# Patient Record
Sex: FEMALE | Race: WHITE | NOT HISPANIC OR LATINO | Employment: OTHER | URBAN - METROPOLITAN AREA
[De-identification: names, ages, dates, MRNs, and addresses within clinical notes are randomized per-mention and may not be internally consistent; named-entity substitution may affect disease eponyms.]

---

## 2020-02-04 ENCOUNTER — OFFICE VISIT (OUTPATIENT)
Dept: OBGYN | Facility: CLINIC | Age: 70
End: 2020-02-04
Payer: MEDICARE

## 2020-02-04 ENCOUNTER — TRANSFERRED RECORDS (OUTPATIENT)
Dept: HEALTH INFORMATION MANAGEMENT | Facility: CLINIC | Age: 70
End: 2020-02-04

## 2020-02-04 VITALS
HEIGHT: 65 IN | HEART RATE: 68 BPM | SYSTOLIC BLOOD PRESSURE: 144 MMHG | WEIGHT: 175.2 LBS | DIASTOLIC BLOOD PRESSURE: 86 MMHG | BODY MASS INDEX: 29.19 KG/M2

## 2020-02-04 DIAGNOSIS — Z01.818 PREOP EXAMINATION: ICD-10-CM

## 2020-02-04 DIAGNOSIS — H26.9 CATARACT OF BOTH EYES, UNSPECIFIED CATARACT TYPE: Primary | ICD-10-CM

## 2020-02-04 PROCEDURE — 99202 OFFICE O/P NEW SF 15 MIN: CPT | Performed by: NURSE PRACTITIONER

## 2020-02-04 RX ORDER — LISINOPRIL 5 MG/1
15 TABLET ORAL
COMMUNITY
Start: 2019-04-23 | End: 2020-08-19 | Stop reason: DRUGHIGH

## 2020-02-04 RX ORDER — METRONIDAZOLE 10 MG/G
GEL TOPICAL EVERY EVENING
COMMUNITY
Start: 2020-01-15

## 2020-02-04 ASSESSMENT — MIFFLIN-ST. JEOR: SCORE: 1317.64

## 2020-02-04 NOTE — PROGRESS NOTES
SUBJECTIVE:                                                   Mandy Barnes is a 69 year old female who presents to clinic today for the following health issue(s):  Patient presents with:  Pre-Op Exam: Has eye laser surgery scheduled on       HPI:  Pt here today for her preop for laser scar tissue removal on both eyes on 2020.    She is feeling well. Follows with her cardiologist irregularly.     No LMP recorded. Patient is postmenopausal..     Patient isn't sexually active, .  Using not sexually active for contraception.    reports that she has never smoked. She has never used smokeless tobacco.    STD testing offered?  Declined    Health maintenance updated:  no, due for mammo. Will get eye surgery first, then schedule later    Today's PHQ-2 Score:   PHQ-2 (  [employer]) 2020   Q1: Little interest or pleasure in doing things 0   Q2: Feeling down, depressed or hopeless 0   PHQ-2 Score 0     Problem list and histories reviewed & adjusted, as indicated.  Additional history: as documented.    There is no problem list on file for this patient.    Past Surgical History:   Procedure Laterality Date     APPENDECTOMY       AS HYSTEROSCOPY W ENDOMETRIAL BX/POLYPECTOMY W/WO D&C       C TOTAL HIP ARTHROPLASTY       EYE SURGERY  2015    cataract removal      Social History     Tobacco Use     Smoking status: Never Smoker     Smokeless tobacco: Never Used   Substance Use Topics     Alcohol use: Yes     Alcohol/week: 0.0 standard drinks     Comment: occ      Problem (# of Occurrences) Relation (Name,Age of Onset)    Cancer (1) Brother    Coronary Artery Disease (1) Father    Hyperlipidemia (1) Father    Hypertension (3) Mother, Sister, Sister    No Known Problems (5) Maternal Grandmother, Maternal Grandfather, Paternal Grandmother, Paternal Grandfather, Other            Current Outpatient Medications   Medication Sig     ASPIRIN PO      atorvastatin (LIPITOR) 80 MG tablet      carvedilol (COREG)  "6.25 MG tablet      lisinopril (PRINIVIL/ZESTRIL) 5 MG tablet Take 15 mg by mouth     metroNIDAZOLE (METROGEL) 1 % external gel      Multiple Vitamins-Minerals (MULTIVITAMIN PO)      No current facility-administered medications for this visit.      Allergies   Allergen Reactions     Penicillins Anaphylaxis     Pollen Extract      Latex Rash       ROS:  12 point review of systems negative other than symptoms noted below or in the HPI.  No urinary frequency or dysuria, bladder or kidney problems      OBJECTIVE:     BP (!) 144/86   Pulse 68   Ht 1.638 m (5' 4.5\")   Wt 79.5 kg (175 lb 3.2 oz)   BMI 29.61 kg/m    Body mass index is 29.61 kg/m .    Exam:  Constitutional:  Appearance: Well nourished, well developed alert, in no acute distress  Neck:  Lymph Nodes:  No lymphadenopathy present; Thyroid:  Gland size normal, nontender, no nodules or masses present on palpation  Chest:  Respiratory Effort:  Breathing unlabored. Clear to auscultation bilaterally.   Cardiovascular: Heart: Auscultation:  Regular rate, normal rhythm, no murmurs present  Gastrointestinal:  Abdominal Examination:  Abdomen nontender to palpation, tone normal without rigidity or guarding, no masses present, umbilicus without lesions; Liver/Spleen:  No hepatomegaly present, liver nontender to palpation; Hernias:  No hernias present  Lymphatic: Lymph Nodes:  No other lymphadenopathy present  Skin: General Inspection:  No rashes present, no lesions present, no areas of discoloration.  Neurologic:  Mental Status:  Oriented X3.  Normal strength and tone, sensory exam grossly normal, mentation intact and speech normal.    Psychiatric:  Mentation appears normal and affect normal/bright.     In-Clinic Test Results:  No results found for this or any previous visit (from the past 24 hour(s)).    ASSESSMENT/PLAN:                                                        ICD-10-CM    1. Cataract of both eyes, unspecified cataract type H26.9    2. Preop examination " Z01.818        There are no Patient Instructions on file for this visit.    Pt cleared for surgery for laser scar tissue removal of both eyes. Needs EKG. Will contact her cardiologist for this.     Pre op form faxed and copy given to pt.    WANDY Etienne CNP  [facility]

## 2020-08-17 NOTE — PROGRESS NOTES
SUBJECTIVE:                                                   Mandy Barnes is a 70 year old female who presents to clinic today for the following health issue(s):  Patient presents with:  Vaginal Problem: possible prolapse or vaginal swelling noticed a week ago      HPI:  71 yo female who hasn't had a pelvic exam since 2016 is here today for pap screening and has concerns about a vaginal swelling/bulge she's felt on her bottom.     She has no pain with the bulge, no vaginal bleeding. She is PM since her 50's she is not on any HRT at this time. She had 2 vaginal deliveries, largest baby was 7#11oz.    She denies any changes to bowel or bladder function. She is not currenlty s.a.     Her last mammogram was in 2016 as well. She has what she calls an irrational fear of an abnormal mammogram which is why she hasn't had one in a while. No fam hx.       No LMP recorded. Patient is postmenopausal..     Patient is not sexually active, .  Using menopause for contraception.    reports that she has never smoked. She has never used smokeless tobacco.    STD testing offered?  Declined - not sexually active    Health maintenance updated:  no    Today's PHQ-2 Score:   PHQ-2 (  [employer]) 2020   Q1: Little interest or pleasure in doing things 0   Q2: Feeling down, depressed or hopeless 0   PHQ-2 Score 0     Today's PHQ-9 Score: No flowsheet data found.  Today's LOKI-7 Score: No flowsheet data found.    Problem list and histories reviewed & adjusted, as indicated.  Additional history: as documented.    There is no problem list on file for this patient.    Past Surgical History:   Procedure Laterality Date     APPENDECTOMY  1969     AS HYSTEROSCOPY W ENDOMETRIAL BX/POLYPECTOMY W/WO D&C       C TOTAL HIP ARTHROPLASTY  2013     EYE SURGERY  2015    cataract removal      Social History     Tobacco Use     Smoking status: Never Smoker     Smokeless tobacco: Never Used   Substance Use Topics     Alcohol use: Yes      "Alcohol/week: 0.0 standard drinks     Comment: occ      Problem (# of Occurrences) Relation (Name,Age of Onset)    Cancer (1) Brother    Coronary Artery Disease (1) Father    Hyperlipidemia (1) Father    Hypertension (3) Mother, Sister, Sister    No Known Problems (5) Maternal Grandmother, Maternal Grandfather, Paternal Grandmother, Paternal Grandfather, Other            Current Outpatient Medications   Medication Sig     ASPIRIN PO      atorvastatin (LIPITOR) 80 MG tablet      carvedilol (COREG) 6.25 MG tablet      metroNIDAZOLE (METROGEL) 1 % external gel      Multiple Vitamins-Minerals (MULTIVITAMIN PO)      lisinopril (ZESTRIL) 20 MG tablet      No current facility-administered medications for this visit.      Allergies   Allergen Reactions     Penicillins Anaphylaxis     Pollen Extract      Latex Rash       ROS:  12 point review of systems negative other than symptoms noted below or in the HPI.  No urinary frequency or dysuria, bladder or kidney problems      OBJECTIVE:     BP (!) 126/96   Ht 1.664 m (5' 5.5\")   Wt 79 kg (174 lb 3.2 oz)   BMI 28.55 kg/m    Body mass index is 28.55 kg/m .    Exam:  Constitutional:  Appearance: Well nourished, well developed alert, in no acute distress  Psychiatric:  Mentation appears normal and affect normal/bright.  Pelvic Exam:  External Genitalia:     Normal appearance for age, no discharge present, no tenderness present, no inflammatory lesions present, color normal- BULGE AT INTROITUS  Vagina:     Normal vaginal vault without central or paravaginal defects, no discharge present, no inflammatory lesions present, no masses present- BULGING AT POSTERIOR VAGINAL WALL.   Bladder:     Nontender to palpation  Urethra:   Urethral Body:  Urethra palpation normal, urethra structural support normal   Urethral Meatus:  No erythema or lesions present  Cervix:     Appearance healthy, no lesions present, nontender to palpation, no bleeding present  Uterus:     Uterus: firm, normal " sized and nontender, anteverted in position.   Adnexa:     No adnexal tenderness present, no adnexal masses present  Perineum:     Perineum within normal limits, no evidence of trauma, no rashes or skin lesions present  Anus:     Anus within normal limits, no hemorrhoids present  Inguinal Lymph Nodes:     No lymphadenopathy present  Pubic Hair:     Normal pubic hair distribution for age  Genitalia and Groin:     No rashes present, no lesions present, no areas of discoloration, no masses present       In-Clinic Test Results:  No results found for this or any previous visit (from the past 24 hour(s)).    ASSESSMENT/PLAN:                                                        ICD-10-CM    1. Screening for cervical cancer  Z12.4 Pap imaged thin layer screen with HPV - recommended age 30 - 65 years (select HPV order below)     HPV High Risk Types DNA Cervical   2. Rectocele  N81.6    3. Encounter for screening mammogram for malignant neoplasm of breast  Z12.31 *MA Screening Digital Bilateral       There are no Patient Instructions on file for this visit.    Pap collected. If NIL no further screening is needed. Needs mammogram done. Referred to DR. Melo for discussion about options for rectocele. Pessary, surgery, conservative management with kegals etc. Pt is asymptomatic at this time.     WANDY Etienne [facility] [place]

## 2020-08-19 ENCOUNTER — OFFICE VISIT (OUTPATIENT)
Dept: OBGYN | Facility: CLINIC | Age: 70
End: 2020-08-19
Payer: MEDICARE

## 2020-08-19 VITALS
DIASTOLIC BLOOD PRESSURE: 96 MMHG | WEIGHT: 174.2 LBS | BODY MASS INDEX: 28 KG/M2 | HEIGHT: 66 IN | SYSTOLIC BLOOD PRESSURE: 126 MMHG

## 2020-08-19 DIAGNOSIS — Z12.31 ENCOUNTER FOR SCREENING MAMMOGRAM FOR MALIGNANT NEOPLASM OF BREAST: ICD-10-CM

## 2020-08-19 DIAGNOSIS — N81.6 RECTOCELE: ICD-10-CM

## 2020-08-19 DIAGNOSIS — Z12.4 SCREENING FOR CERVICAL CANCER: Primary | ICD-10-CM

## 2020-08-19 PROCEDURE — G0476 HPV COMBO ASSAY CA SCREEN: HCPCS | Mod: GZ | Performed by: NURSE PRACTITIONER

## 2020-08-19 PROCEDURE — G0145 SCR C/V CYTO,THINLAYER,RESCR: HCPCS | Performed by: NURSE PRACTITIONER

## 2020-08-19 PROCEDURE — 99213 OFFICE O/P EST LOW 20 MIN: CPT | Performed by: NURSE PRACTITIONER

## 2020-08-19 PROCEDURE — 87624 HPV HI-RISK TYP POOLED RSLT: CPT | Performed by: NURSE PRACTITIONER

## 2020-08-19 RX ORDER — LISINOPRIL 20 MG/1
20 TABLET ORAL EVERY MORNING
COMMUNITY
Start: 2020-07-22

## 2020-08-19 ASSESSMENT — MIFFLIN-ST. JEOR: SCORE: 1323.98

## 2020-08-19 NOTE — LETTER
August 28, 2020    Mandy ESPINOSA Cameron  [address] [place] [id number]    Dear lps,  This letter is regarding your recent Pap smear (cervical cancer screening) and Human Papillomavirus (HPV) test.  We are happy to inform you that your Pap smear result is normal. Cervical cancer is closely linked with certain types of HPV. Your results showed no evidence of high-risk HPV.  No further Pap screening is needed.  You will still need to return to the clinic every year for an annual exam and other preventive tests.  If you have additional questions regarding this result, please call our registered nurse, Yaneth at [phone].  Sincerely,    Sherry Naranjo, APRN CNP/rlm

## 2020-08-24 LAB
COPATH REPORT: NORMAL
PAP: NORMAL

## 2020-08-26 LAB
FINAL DIAGNOSIS: NORMAL
HPV HR 12 DNA CVX QL NAA+PROBE: NEGATIVE
HPV16 DNA SPEC QL NAA+PROBE: NEGATIVE
HPV18 DNA SPEC QL NAA+PROBE: NEGATIVE
SPECIMEN DESCRIPTION: NORMAL
SPECIMEN SOURCE CVX/VAG CYTO: NORMAL

## 2020-09-01 NOTE — PROGRESS NOTES
SUBJECTIVE:                                                   Mandy Barnes is a 70 year old female who presents to clinic today for the following health issue(s):  Patient presents with:  Consult: Consult for rectocele. Saw JUSTINO and was referred to Dr. Melo for a consult on intervention at this time.        HPI: The patient is seen at this time for a rather acute history of a significant vaginal bulge.  She has had 2 vaginal deliveries of less than 8 pound babies.  She has had a life of doing lots of lifting and is never thought of any restrictions to any activity.  She is not sexually active at this time.  She denies any urinary stress incontinence.  She does not admit to any splinting for defecation.      No LMP recorded. Patient is postmenopausal..     Patient is not sexually active, .  Using menopause for contraception.    reports that she has never smoked. She has never used smokeless tobacco.    STD testing offered?  Declined    Health maintenance updated:  yes    Today's PHQ-2 Score:   PHQ-2 (  [employer]) 2020   Q1: Little interest or pleasure in doing things 0   Q2: Feeling down, depressed or hopeless 0   PHQ-2 Score 0     Today's PHQ-9 Score: No flowsheet data found.  Today's LOKI-7 Score: No flowsheet data found.    Problem list and histories reviewed & adjusted, as indicated.  Additional history: as documented.    There is no problem list on file for this patient.    Past Surgical History:   Procedure Laterality Date     APPENDECTOMY  1969     AS HYSTEROSCOPY W ENDOMETRIAL BX/POLYPECTOMY W/WO D&C       C TOTAL HIP ARTHROPLASTY       EYE SURGERY  2015    cataract removal      Social History     Tobacco Use     Smoking status: Never Smoker     Smokeless tobacco: Never Used   Substance Use Topics     Alcohol use: Yes     Alcohol/week: 0.0 standard drinks     Comment: occ      Problem (# of Occurrences) Relation (Name,Age of Onset)    Cancer (1) Brother    Coronary Artery Disease (1)  "Father    Hyperlipidemia (1) Father    Hypertension (3) Mother, Sister, Sister    No Known Problems (5) Maternal Grandmother, Maternal Grandfather, Paternal Grandmother, Paternal Grandfather, Other            Current Outpatient Medications   Medication Sig     ASPIRIN PO      atorvastatin (LIPITOR) 80 MG tablet      carvedilol (COREG) 6.25 MG tablet      lisinopril (ZESTRIL) 20 MG tablet      metroNIDAZOLE (METROGEL) 1 % external gel      Multiple Vitamins-Minerals (MULTIVITAMIN PO)      No current facility-administered medications for this visit.      Allergies   Allergen Reactions     Penicillins Anaphylaxis     Pollen Extract      Latex Rash       ROS:  12 point review of systems negative other than symptoms noted below or in the HPI.  No urinary frequency or dysuria, bladder or kidney problems      OBJECTIVE:     BP (!) 142/96   Ht 1.664 m (5' 5.5\")   Wt 78.6 kg (173 lb 3.2 oz)   Breastfeeding No   BMI 28.38 kg/m    Body mass index is 28.38 kg/m .    Exam:  Gastrointestinal:  Abdominal Examination:  Abdomen nontender to palpation, tone normal without rigidity or guarding, no masses present, umbilicus without lesions; Liver/Spleen:  No hepatomegaly present, liver nontender to palpation; Hernias:  No hernias present  Lymphatic: Lymph Nodes:  No other lymphadenopathy present  Skin: General Inspection:  No rashes present, no lesions present, no areas of discoloration.  Neurologic:  Mental Status:  Oriented X3.  Normal strength and tone, sensory exam grossly normal, mentation intact and speech normal.    Psychiatric:  Mentation appears normal and affect normal/bright.  Pelvic Exam:  External Genitalia:     Normal appearance for age, no discharge present, no tenderness present, no inflammatory lesions present, color normal  Vagina: Telescoping rectocele present.    Bladder: No cystocele   Nontender to palpation  Urethra:   Urethral Body:  Urethra palpation normal, urethra structural support normal   Urethral " Meatus:  No erythema or lesions present  Cervix:     Appearance healthy, no lesions present, nontender to palpation, no bleeding present  Uterus: 1.5 degree descensus   Nontender to palpation, no masses present, position anteflexed, mobility: normal  Adnexa:     No adnexal tenderness present, no adnexal masses present  Perineum:     Perineum within normal limits, no evidence of trauma, no rashes or skin lesions present  Inguinal Lymph Nodes:     No lymphadenopathy present       In-Clinic Test Results:      ASSESSMENT/PLAN:                                                      70-year-old patient with a significant grade 3 rectocele and 1.5 degree uterine descensus.  She has excellent anterior support of the bladder neck and no cystocele present.  We have gone over the anatomy and pathophysiology of these herniations of the pelvic floor.  She has a very active lifestyle and she is stunned at the possibility of not being able to lift anything heavy again.  We have reviewed the entire surgical procedure of removing her uterus tubes and ovaries and doing a enterocele and rectocele repair.  She is not sexually active at this time but does not preclude the possibility down the road.  She will decide on disposition of this surgery when she forms a plan for day-to-day living without lifting.            Mil Melo MD  [facility] [place]

## 2020-09-02 ENCOUNTER — OFFICE VISIT (OUTPATIENT)
Dept: OBGYN | Facility: CLINIC | Age: 70
End: 2020-09-02
Payer: MEDICARE

## 2020-09-02 VITALS
SYSTOLIC BLOOD PRESSURE: 142 MMHG | BODY MASS INDEX: 27.83 KG/M2 | DIASTOLIC BLOOD PRESSURE: 96 MMHG | HEIGHT: 66 IN | WEIGHT: 173.2 LBS

## 2020-09-02 DIAGNOSIS — N81.2 UTEROVAGINAL PROLAPSE, INCOMPLETE: Primary | ICD-10-CM

## 2020-09-02 PROCEDURE — 99214 OFFICE O/P EST MOD 30 MIN: CPT | Performed by: OBSTETRICS & GYNECOLOGY

## 2020-09-02 ASSESSMENT — MIFFLIN-ST. JEOR: SCORE: 1319.44

## 2020-11-03 NOTE — PROGRESS NOTES
SUBJECTIVE:                                                   Mandy Barnes is a 70 year old female who presents to clinic today for the following health issue(s):  Patient presents with:  Consult: Consult for surgical procedure of removing her uterus tubes and ovaries and doing a enterocele and rectocele repair.      HPI: The patient is seen at this time for consultation about her rectocele and enterocele.  She does have some uterine descensus.  She has numerous questions about what her level of functioning will be from now until surgery and then postoperatively.  She does not plan on doing this until the spring.      No LMP recorded. Patient is postmenopausal..     Patient is not sexually active, .  Using menopause for contraception.    reports that she has never smoked. She has never used smokeless tobacco.    STD testing offered?  Declined    Health maintenance updated:  yes    Today's PHQ-2 Score:   PHQ-2 (  [employer]) 2020   Q1: Little interest or pleasure in doing things 0   Q2: Feeling down, depressed or hopeless 0   PHQ-2 Score 0     Today's PHQ-9 Score: No flowsheet data found.  Today's LOKI-7 Score: No flowsheet data found.    Problem list and histories reviewed & adjusted, as indicated.  Additional history: as documented.    There is no problem list on file for this patient.    Past Surgical History:   Procedure Laterality Date     APPENDECTOMY  1969     AS HYSTEROSCOPY W ENDOMETRIAL BX/POLYPECTOMY W/WO D&C       C TOTAL HIP ARTHROPLASTY       EYE SURGERY  2015    cataract removal      Social History     Tobacco Use     Smoking status: Never Smoker     Smokeless tobacco: Never Used   Substance Use Topics     Alcohol use: Yes     Alcohol/week: 0.0 standard drinks     Comment: occ      Problem (# of Occurrences) Relation (Name,Age of Onset)    Cancer (1) Brother    Coronary Artery Disease (1) Father    Hyperlipidemia (1) Father    Hypertension (3) Mother, Sister, Sister    No Known  "Problems (5) Maternal Grandmother, Maternal Grandfather, Paternal Grandmother, Paternal Grandfather, Other            Current Outpatient Medications   Medication Sig     ASPIRIN PO      atorvastatin (LIPITOR) 80 MG tablet      carvedilol (COREG) 6.25 MG tablet      lisinopril (ZESTRIL) 20 MG tablet      metroNIDAZOLE (METROGEL) 1 % external gel      Multiple Vitamins-Minerals (MULTIVITAMIN PO)      No current facility-administered medications for this visit.      Allergies   Allergen Reactions     Penicillins Anaphylaxis     Pollen Extract      Latex Rash       ROS:  12 point review of systems negative other than symptoms noted below or in the HPI.  No urinary frequency or dysuria, bladder or kidney problems      OBJECTIVE:     BP (!) 170/82   Ht 1.651 m (5' 5\")   BMI 28.82 kg/m    Body mass index is 28.82 kg/m .    Exam:  Constitutional:  Appearance: Well nourished, well developed alert, in no acute distress     In-Clinic Test Results:      ASSESSMENT/PLAN:                                                      Patient with uterine descensus and a large rectocele.  We spent 15 minutes going over all of her preoperative weight restrictions and activity.  She plans on doing this surgery in February or March and will return to see us in January.  She has no stress component and will not need any urodynamics.      Mil Melo MD  [facility] [place]  "

## 2020-11-04 ENCOUNTER — OFFICE VISIT (OUTPATIENT)
Dept: OBGYN | Facility: CLINIC | Age: 70
End: 2020-11-04
Payer: MEDICARE

## 2020-11-04 VITALS — DIASTOLIC BLOOD PRESSURE: 82 MMHG | BODY MASS INDEX: 28.82 KG/M2 | SYSTOLIC BLOOD PRESSURE: 170 MMHG | HEIGHT: 65 IN

## 2020-11-04 DIAGNOSIS — N81.2 UTEROVAGINAL PROLAPSE, INCOMPLETE: Primary | ICD-10-CM

## 2020-11-04 PROCEDURE — 99213 OFFICE O/P EST LOW 20 MIN: CPT | Performed by: OBSTETRICS & GYNECOLOGY

## 2021-01-04 ENCOUNTER — ANCILLARY PROCEDURE (OUTPATIENT)
Dept: MAMMOGRAPHY | Facility: CLINIC | Age: 71
End: 2021-01-04
Attending: NURSE PRACTITIONER
Payer: MEDICARE

## 2021-01-04 DIAGNOSIS — Z12.31 ENCOUNTER FOR SCREENING MAMMOGRAM FOR MALIGNANT NEOPLASM OF BREAST: ICD-10-CM

## 2021-01-04 PROCEDURE — 77067 SCR MAMMO BI INCL CAD: CPT | Mod: TC | Performed by: RADIOLOGY

## 2021-01-15 ENCOUNTER — HEALTH MAINTENANCE LETTER (OUTPATIENT)
Age: 71
End: 2021-01-15

## 2021-01-26 NOTE — PROGRESS NOTES
SUBJECTIVE:                                                   Mandy Barnes is a 70 year old female who presents to clinic today for the following health issue(s):  Patient presents with:  Consult: F/u consult for surgical procedure of removing her uterus, tubes, and ovaries and doing a enterocele and rectocele repair.        HPI: The patient is a 70-year-old  2 who has been worked up for uterovaginal prolapse with symptomatic enterocele and rectocele.  We have recommended a laparoscopic approach for removal of the uterus tubes and ovaries and repair of all components of relaxation.      No LMP recorded. Patient is postmenopausal..     Patient is not sexually active, .  Using menopause for contraception.    reports that she has never smoked. She has never used smokeless tobacco.      Health maintenance updated:  yes    Today's PHQ-2 Score:   PHQ-2 (  [employer]) 2020   Q1: Little interest or pleasure in doing things 0   Q2: Feeling down, depressed or hopeless 0   PHQ-2 Score 0     Today's PHQ-9 Score: No flowsheet data found.  Today's LOKI-7 Score: No flowsheet data found.    Problem list and histories reviewed & adjusted, as indicated.  Additional history: as documented.    There is no problem list on file for this patient.    Past Surgical History:   Procedure Laterality Date     APPENDECTOMY  1969     AS HYSTEROSCOPY W ENDOMETRIAL BX/POLYPECTOMY W/WO D&C       C TOTAL HIP ARTHROPLASTY       EYE SURGERY  2015    cataract removal      Social History     Tobacco Use     Smoking status: Never Smoker     Smokeless tobacco: Never Used   Substance Use Topics     Alcohol use: Yes     Alcohol/week: 0.0 standard drinks     Comment: occ      Problem (# of Occurrences) Relation (Name,Age of Onset)    Cancer (1) Brother    Coronary Artery Disease (1) Father    Hyperlipidemia (1) Father    Hypertension (3) Mother, Sister, Sister    No Known Problems (5) Maternal Grandmother, Maternal Grandfather,  "Paternal Grandmother, Paternal Grandfather, Other            Current Outpatient Medications   Medication Sig     ASPIRIN PO      atorvastatin (LIPITOR) 80 MG tablet      carvedilol (COREG) 6.25 MG tablet      lisinopril (ZESTRIL) 20 MG tablet      metroNIDAZOLE (METROGEL) 1 % external gel      Multiple Vitamins-Minerals (MULTIVITAMIN PO)      No current facility-administered medications for this visit.      Allergies   Allergen Reactions     Penicillins Anaphylaxis     Pollen Extract      Latex Rash       ROS:  12 point review of systems negative other than symptoms noted below or in the HPI.  No urinary frequency or dysuria, bladder or kidney problems      OBJECTIVE:     BP (!) 178/94   Ht 1.651 m (5' 5\")   Wt 77.2 kg (170 lb 3.2 oz)   Breastfeeding No   BMI 28.32 kg/m    Body mass index is 28.32 kg/m .    Exam:  Constitutional:  Appearance: Well nourished, well developed alert, in no acute distress  Neck:  Lymph Nodes:  No lymphadenopathy present; Thyroid:  Gland size normal, nontender, no nodules or masses present on palpation  Chest:  Respiratory Effort:  Breathing unlabored. Clear to auscultation bilaterally.   Cardiovascular: Heart: Auscultation:  Regular rate, normal rhythm, no murmurs present  Gastrointestinal:  Abdominal Examination:  Abdomen nontender to palpation, tone normal without rigidity or guarding, no masses present, umbilicus without lesions; Liver/Spleen:  No hepatomegaly present, liver nontender to palpation; Hernias:  No hernias present  Lymphatic: Lymph Nodes:  No other lymphadenopathy present  Skin: General Inspection:  No rashes present, no lesions present, no areas of discoloration.  Neurologic:  Mental Status:  Oriented X3.  Normal strength and tone, sensory exam grossly normal, mentation intact and speech normal.    Psychiatric:  Mentation appears normal and affect normal/bright.  No Pelvic Exam performed     In-Clinic Test Results:      ASSESSMENT/PLAN:                                "                         70-year-old patient with uterovaginal prolapse.  We have recommended a laparoscopic-assisted vaginal hysterectomy with bilateral salpingo-oophorectomy anterior and posterior repair and enterocele repair.  The risk and complications were reviewed and accepted.  The patient will be seeing her cardiologist tomorrow as she has a history of an arrhythmia.  We will see her back for her preoperative examination the week before her actual surgery day.          Mil Melo MD  [facility] [place]

## 2021-01-27 ENCOUNTER — PREP FOR PROCEDURE (OUTPATIENT)
Dept: OBGYN | Facility: CLINIC | Age: 71
End: 2021-01-27

## 2021-01-27 ENCOUNTER — TELEPHONE (OUTPATIENT)
Dept: OBGYN | Facility: CLINIC | Age: 71
End: 2021-01-27

## 2021-01-27 ENCOUNTER — OFFICE VISIT (OUTPATIENT)
Dept: OBGYN | Facility: CLINIC | Age: 71
End: 2021-01-27
Payer: MEDICARE

## 2021-01-27 VITALS
WEIGHT: 170.2 LBS | DIASTOLIC BLOOD PRESSURE: 94 MMHG | HEIGHT: 65 IN | SYSTOLIC BLOOD PRESSURE: 178 MMHG | BODY MASS INDEX: 28.36 KG/M2

## 2021-01-27 DIAGNOSIS — N81.4 UTEROVAGINAL PROLAPSE: Primary | ICD-10-CM

## 2021-01-27 PROCEDURE — 99214 OFFICE O/P EST MOD 30 MIN: CPT | Performed by: OBSTETRICS & GYNECOLOGY

## 2021-01-27 ASSESSMENT — MIFFLIN-ST. JEOR: SCORE: 1297.9

## 2021-01-27 NOTE — TELEPHONE ENCOUNTER
Type of surgery: LAVH BSO A&P RPR  Location of surgery: [facility] OR  Date and time of surgery: 4/6/2021 7:20a  Surgeon: SHANNON  Pre-Op Appt Date: 3/31/2021   Post-Op Appt Date: TBD   Packet sent out: HANDED 1/27/2021  Pre-cert/Authorization completed:  TBD  Date: 1/27/2021 Bernice silva/Lizbet COYNEID TEST 4/3/2021 TBD    Cristine Vera  Surgery Scheduler    DX N81.4  CPT 26656   [id number]

## 2021-03-01 NOTE — TELEPHONE ENCOUNTER
SX DATE CHANGED DUE TO EB OUT  Spoke shira/Cristine for changes    4/20/2021 7:20a   COVID 4/17/2021 10 [facility]    Pt aware via phone call    Cristine Reeves  Surgery Scheduler

## 2021-03-30 NOTE — PROGRESS NOTES
SUBJECTIVE:                                                   Mandy Barnes is a 70 year old female who presents to clinic today for the following health issue(s):  Patient presents with:  Pre-Op Exam: TOTAL LAPAROSCOPIC VAGINAL HYSTERECTOMY , BILATERAL SALPINGO-OOPHORECTOMY        HPI: The patient is being seen at this time for preoperative clearance for a laparoscopic-assisted vaginal hysterectomy with bilateral salpingo-oophorectomy.  The patient's overall health is good.  She had a hemoglobin and her cardiologist on 3/24 that was 14.7 g%.  She is on antihypertensive medicines and anticholesterol drugs that we reviewed for her preop day.      No LMP recorded. Patient is postmenopausal..     Patient is not sexually active, .  Using menopause for contraception.    reports that she has never smoked. She has never used smokeless tobacco.    STD testing offered?  Declined    Health maintenance updated:  yes    Today's PHQ-2 Score:   PHQ-2 (  [employer]) 2020   Q1: Little interest or pleasure in doing things 0   Q2: Feeling down, depressed or hopeless 0   PHQ-2 Score 0     Today's PHQ-9 Score: No flowsheet data found.  Today's LOKI-7 Score: No flowsheet data found.    Problem list and histories reviewed & adjusted, as indicated.  Additional history: as documented.    Patient Active Problem List   Diagnosis     Uterovaginal prolapse     Past Surgical History:   Procedure Laterality Date     APPENDECTOMY  1969     AS HYSTEROSCOPY W ENDOMETRIAL BX/POLYPECTOMY W/WO D&C       C TOTAL HIP ARTHROPLASTY       EYE SURGERY  2015    cataract removal      Social History     Tobacco Use     Smoking status: Never Smoker     Smokeless tobacco: Never Used   Substance Use Topics     Alcohol use: Yes     Alcohol/week: 0.0 standard drinks     Comment: occ      Problem (# of Occurrences) Relation (Name,Age of Onset)    Cancer (1) Brother    Coronary Artery Disease (1) Father    Hyperlipidemia (1) Father    Hypertension (3)  "Mother, Sister, Sister    No Known Problems (5) Maternal Grandmother, Maternal Grandfather, Paternal Grandmother, Paternal Grandfather, Other            Current Outpatient Medications   Medication Sig     ASPIRIN PO      atorvastatin (LIPITOR) 80 MG tablet      carvedilol (COREG) 6.25 MG tablet 6.25 mg 2 times daily (with meals) Also takes 12.5 in the evening     lisinopril (ZESTRIL) 20 MG tablet      LUMIGAN 0.01 % SOLN ophthalmic solution      metroNIDAZOLE (METROGEL) 1 % external gel      Multiple Vitamins-Minerals (MULTIVITAMIN PO)      No current facility-administered medications for this visit.      Allergies   Allergen Reactions     Penicillins Anaphylaxis     Pollen Extract      Latex Rash       ROS:  12 point review of systems negative other than symptoms noted below or in the HPI.  No urinary frequency or dysuria, bladder or kidney problems      OBJECTIVE:     BP (!) 152/78   Ht 1.651 m (5' 5\")   Wt 76 kg (167 lb 9.6 oz)   Breastfeeding No   BMI 27.89 kg/m    Body mass index is 27.89 kg/m .    Exam:  Constitutional:  Appearance: Well nourished, well developed alert, in no acute distress  Neck:  Lymph Nodes:  No lymphadenopathy present; Thyroid:  Gland size normal, nontender, no nodules or masses present on palpation  Chest:  Respiratory Effort:  Breathing unlabored. Clear to auscultation bilaterally.   Cardiovascular: Heart: Auscultation:  Regular rate, normal rhythm, no murmurs present  Gastrointestinal:  Abdominal Examination:  Abdomen nontender to palpation, tone normal without rigidity or guarding, no masses present, umbilicus without lesions; Liver/Spleen:  No hepatomegaly present, liver nontender to palpation; Hernias:  No hernias present  Lymphatic: Lymph Nodes:  No other lymphadenopathy present  Skin: General Inspection:  No rashes present, no lesions present, no areas of discoloration.  Neurologic:  Mental Status:  Oriented X3.  Normal strength and tone, sensory exam grossly normal, mentation " intact and speech normal.    Psychiatric:  Mentation appears normal and affect normal/bright.  Pelvic Exam:  External Genitalia:     Normal appearance for age, no discharge present, no tenderness present, no inflammatory lesions present, color normal  Vagina:     Normal vaginal vault without central or paravaginal defects, ATROPHIC  Bladder:     Nontender to palpation  Urethra:   Urethral Body:  Urethra palpation normal, urethra structural support normal   Urethral Meatus:  No erythema or lesions present  Cervix:     Appearance healthy, no lesions present, nontender to palpation, no bleeding present  Uterus:     Nontender to palpation, no masses present, position anteflexed, mobility: normal  Adnexa:     No adnexal tenderness present, no adnexal masses present  Perineum:     Perineum within normal limits, no evidence of trauma, no rashes or skin lesions present  Inguinal Lymph Nodes:     No lymphadenopathy present       In-Clinic Test Results:      ASSESSMENT/PLAN:                                                        ICD-10-CM    1. Prolapse of vaginal vault after hysterectomy  N99.3          70-year-old patient with uterovaginal prolapse but no stress incontinence.  She is cleared for anesthesia and has good hemoglobin.  She understands the risk and complications of lap assisted vaginal hysterectomy with bilateral salpingo-oophorectomy.      Mil Melo MD  [facility] [place]

## 2021-03-30 NOTE — H&P (VIEW-ONLY)
SUBJECTIVE:                                                   Mandy Barnes is a 70 year old female who presents to clinic today for the following health issue(s):  Patient presents with:  Pre-Op Exam: TOTAL LAPAROSCOPIC VAGINAL HYSTERECTOMY , BILATERAL SALPINGO-OOPHORECTOMY        HPI: The patient is being seen at this time for preoperative clearance for a laparoscopic-assisted vaginal hysterectomy with bilateral salpingo-oophorectomy.  The patient's overall health is good.  She had a hemoglobin and her cardiologist on 3/24 that was 14.7 g%.  She is on antihypertensive medicines and anticholesterol drugs that we reviewed for her preop day.      No LMP recorded. Patient is postmenopausal..     Patient is not sexually active, .  Using menopause for contraception.    reports that she has never smoked. She has never used smokeless tobacco.    STD testing offered?  Declined    Health maintenance updated:  yes    Today's PHQ-2 Score:   PHQ-2 (  [employer]) 2020   Q1: Little interest or pleasure in doing things 0   Q2: Feeling down, depressed or hopeless 0   PHQ-2 Score 0     Today's PHQ-9 Score: No flowsheet data found.  Today's LOKI-7 Score: No flowsheet data found.    Problem list and histories reviewed & adjusted, as indicated.  Additional history: as documented.    Patient Active Problem List   Diagnosis     Uterovaginal prolapse     Past Surgical History:   Procedure Laterality Date     APPENDECTOMY  1969     AS HYSTEROSCOPY W ENDOMETRIAL BX/POLYPECTOMY W/WO D&C       C TOTAL HIP ARTHROPLASTY       EYE SURGERY  2015    cataract removal      Social History     Tobacco Use     Smoking status: Never Smoker     Smokeless tobacco: Never Used   Substance Use Topics     Alcohol use: Yes     Alcohol/week: 0.0 standard drinks     Comment: occ      Problem (# of Occurrences) Relation (Name,Age of Onset)    Cancer (1) Brother    Coronary Artery Disease (1) Father    Hyperlipidemia (1) Father    Hypertension (3)  "Mother, Sister, Sister    No Known Problems (5) Maternal Grandmother, Maternal Grandfather, Paternal Grandmother, Paternal Grandfather, Other            Current Outpatient Medications   Medication Sig     ASPIRIN PO      atorvastatin (LIPITOR) 80 MG tablet      carvedilol (COREG) 6.25 MG tablet 6.25 mg 2 times daily (with meals) Also takes 12.5 in the evening     lisinopril (ZESTRIL) 20 MG tablet      LUMIGAN 0.01 % SOLN ophthalmic solution      metroNIDAZOLE (METROGEL) 1 % external gel      Multiple Vitamins-Minerals (MULTIVITAMIN PO)      No current facility-administered medications for this visit.      Allergies   Allergen Reactions     Penicillins Anaphylaxis     Pollen Extract      Latex Rash       ROS:  12 point review of systems negative other than symptoms noted below or in the HPI.  No urinary frequency or dysuria, bladder or kidney problems      OBJECTIVE:     BP (!) 152/78   Ht 1.651 m (5' 5\")   Wt 76 kg (167 lb 9.6 oz)   Breastfeeding No   BMI 27.89 kg/m    Body mass index is 27.89 kg/m .    Exam:  Constitutional:  Appearance: Well nourished, well developed alert, in no acute distress  Neck:  Lymph Nodes:  No lymphadenopathy present; Thyroid:  Gland size normal, nontender, no nodules or masses present on palpation  Chest:  Respiratory Effort:  Breathing unlabored. Clear to auscultation bilaterally.   Cardiovascular: Heart: Auscultation:  Regular rate, normal rhythm, no murmurs present  Gastrointestinal:  Abdominal Examination:  Abdomen nontender to palpation, tone normal without rigidity or guarding, no masses present, umbilicus without lesions; Liver/Spleen:  No hepatomegaly present, liver nontender to palpation; Hernias:  No hernias present  Lymphatic: Lymph Nodes:  No other lymphadenopathy present  Skin: General Inspection:  No rashes present, no lesions present, no areas of discoloration.  Neurologic:  Mental Status:  Oriented X3.  Normal strength and tone, sensory exam grossly normal, mentation " intact and speech normal.    Psychiatric:  Mentation appears normal and affect normal/bright.  Pelvic Exam:  External Genitalia:     Normal appearance for age, no discharge present, no tenderness present, no inflammatory lesions present, color normal  Vagina:     Normal vaginal vault without central or paravaginal defects, ATROPHIC  Bladder:     Nontender to palpation  Urethra:   Urethral Body:  Urethra palpation normal, urethra structural support normal   Urethral Meatus:  No erythema or lesions present  Cervix:     Appearance healthy, no lesions present, nontender to palpation, no bleeding present  Uterus:     Nontender to palpation, no masses present, position anteflexed, mobility: normal  Adnexa:     No adnexal tenderness present, no adnexal masses present  Perineum:     Perineum within normal limits, no evidence of trauma, no rashes or skin lesions present  Inguinal Lymph Nodes:     No lymphadenopathy present       In-Clinic Test Results:      ASSESSMENT/PLAN:                                                        ICD-10-CM    1. Prolapse of vaginal vault after hysterectomy  N99.3          70-year-old patient with uterovaginal prolapse but no stress incontinence.  She is cleared for anesthesia and has good hemoglobin.  She understands the risk and complications of lap assisted vaginal hysterectomy with bilateral salpingo-oophorectomy.      Mil Melo MD  [facility] [place]

## 2021-03-31 ENCOUNTER — OFFICE VISIT (OUTPATIENT)
Dept: OBGYN | Facility: CLINIC | Age: 71
End: 2021-03-31
Payer: MEDICARE

## 2021-03-31 VITALS
SYSTOLIC BLOOD PRESSURE: 152 MMHG | BODY MASS INDEX: 27.92 KG/M2 | WEIGHT: 167.6 LBS | DIASTOLIC BLOOD PRESSURE: 78 MMHG | HEIGHT: 65 IN

## 2021-03-31 DIAGNOSIS — N99.3 PROLAPSE OF VAGINAL VAULT AFTER HYSTERECTOMY: Primary | ICD-10-CM

## 2021-03-31 PROCEDURE — 99213 OFFICE O/P EST LOW 20 MIN: CPT | Performed by: OBSTETRICS & GYNECOLOGY

## 2021-03-31 RX ORDER — BIMATOPROST 0.1 MG/ML
1 SOLUTION/ DROPS OPHTHALMIC EVERY EVENING
COMMUNITY
Start: 2021-02-02

## 2021-03-31 ASSESSMENT — MIFFLIN-ST. JEOR: SCORE: 1286.11

## 2021-04-05 DIAGNOSIS — Z11.59 ENCOUNTER FOR SCREENING FOR OTHER VIRAL DISEASES: ICD-10-CM

## 2021-04-06 RX ORDER — ASPIRIN 81 MG/1
81 TABLET ORAL EVERY MORNING
COMMUNITY

## 2021-04-06 RX ORDER — CARVEDILOL 12.5 MG/1
12.5 TABLET ORAL
COMMUNITY

## 2021-04-06 RX ORDER — ACETAMINOPHEN 500 MG
500-1000 TABLET ORAL DAILY PRN
COMMUNITY
End: 2021-04-19

## 2021-04-06 RX ORDER — LORATADINE 10 MG/1
10 TABLET ORAL EVERY MORNING
COMMUNITY

## 2021-04-14 NOTE — H&P
3/31/2021  [facility] Mil Rangel MD  OB/Gyn  Prolapse of vaginal vault after hysterectomy  Dx  Pre-Op Exam    Reason for Visit   Progress Notes          SUBJECTIVE:                                                   Mandy Barnes is a 70 year old female who presents to clinic today for the following health issue(s):  Patient presents with:  Pre-Op Exam: TOTAL LAPAROSCOPIC VAGINAL HYSTERECTOMY , BILATERAL SALPINGO-OOPHORECTOMY           HPI: The patient is being seen at this time for preoperative clearance for a laparoscopic-assisted vaginal hysterectomy with bilateral salpingo-oophorectomy.  The patient's overall health is good.  She had a hemoglobin and her cardiologist on 3/24 that was 14.7 g%.  She is on antihypertensive medicines and anticholesterol drugs that we reviewed for her preop day.        No LMP recorded. Patient is postmenopausal..      Patient is not sexually active, .  Using menopause for contraception.    reports that she has never smoked. She has never used smokeless tobacco.     STD testing offered?  Declined     Health maintenance updated:  yes     Today's PHQ-2 Score:   PHQ-2 (  [employer]) 2020   Q1: Little interest or pleasure in doing things 0   Q2: Feeling down, depressed or hopeless 0   PHQ-2 Score 0      Today's PHQ-9 Score: No flowsheet data found.  Today's LOKI-7 Score: No flowsheet data found.     Problem list and histories reviewed & adjusted, as indicated.  Additional history: as documented.     Patient Active Problem List   Diagnosis     Uterovaginal prolapse             Past Surgical History:   Procedure Laterality Date     APPENDECTOMY        AS HYSTEROSCOPY W ENDOMETRIAL BX/POLYPECTOMY W/WO D&C         C TOTAL HIP ARTHROPLASTY        EYE SURGERY   2015     cataract removal       Social History            Tobacco Use     Smoking status: Never Smoker     Smokeless tobacco: Never Used   Substance Use Topics     Alcohol use:  "Yes       Alcohol/week: 0.0 standard drinks       Comment: occ       Problem (# of Occurrences) Relation (Name,Age of Onset)     Cancer (1) Brother     Coronary Artery Disease (1) Father     Hyperlipidemia (1) Father     Hypertension (3) Mother, Sister, Sister     No Known Problems (5) Maternal Grandmother, Maternal Grandfather, Paternal Grandmother, Paternal Grandfather, Other              Current Outpatient Medications   Medication Sig     ASPIRIN PO       atorvastatin (LIPITOR) 80 MG tablet       carvedilol (COREG) 6.25 MG tablet 6.25 mg 2 times daily (with meals) Also takes 12.5 in the evening     lisinopril (ZESTRIL) 20 MG tablet       LUMIGAN 0.01 % SOLN ophthalmic solution       metroNIDAZOLE (METROGEL) 1 % external gel       Multiple Vitamins-Minerals (MULTIVITAMIN PO)        No current facility-administered medications for this visit.            Allergies   Allergen Reactions     Penicillins Anaphylaxis     Pollen Extract       Latex Rash         ROS:  12 point review of systems negative other than symptoms noted below or in the HPI.  No urinary frequency or dysuria, bladder or kidney problems        OBJECTIVE:      BP (!) 152/78   Ht 1.651 m (5' 5\")   Wt 76 kg (167 lb 9.6 oz)   Breastfeeding No   BMI 27.89 kg/m    Body mass index is 27.89 kg/m .     Exam:  Constitutional:  Appearance: Well nourished, well developed alert, in no acute distress  Neck:  Lymph Nodes:  No lymphadenopathy present; Thyroid:  Gland size normal, nontender, no nodules or masses present on palpation  Chest:  Respiratory Effort:  Breathing unlabored. Clear to auscultation bilaterally.   Cardiovascular: Heart: Auscultation:  Regular rate, normal rhythm, no murmurs present  Gastrointestinal:  Abdominal Examination:  Abdomen nontender to palpation, tone normal without rigidity or guarding, no masses present, umbilicus without lesions; Liver/Spleen:  No hepatomegaly present, liver nontender to palpation; Hernias:  No hernias " present  Lymphatic: Lymph Nodes:  No other lymphadenopathy present  Skin: General Inspection:  No rashes present, no lesions present, no areas of discoloration.  Neurologic:  Mental Status:  Oriented X3.  Normal strength and tone, sensory exam grossly normal, mentation intact and speech normal.    Psychiatric:  Mentation appears normal and affect normal/bright.  Pelvic Exam:  External Genitalia:                Normal appearance for age, no discharge present, no tenderness present, no inflammatory lesions present, color normal  Vagina:                Normal vaginal vault without central or paravaginal defects, ATROPHIC  Bladder:                Nontender to palpation  Urethra:              Urethral Body:  Urethra palpation normal, urethra structural support normal              Urethral Meatus:  No erythema or lesions present  Cervix:                Appearance healthy, no lesions present, nontender to palpation, no bleeding present  Uterus:                Nontender to palpation, no masses present, position anteflexed, mobility: normal  Adnexa:                No adnexal tenderness present, no adnexal masses present  Perineum:                Perineum within normal limits, no evidence of trauma, no rashes or skin lesions present  Inguinal Lymph Nodes:                No lymphadenopathy present        In-Clinic Test Results:        ASSESSMENT/PLAN:                                                           ICD-10-CM     1. Prolapse of vaginal vault after hysterectomy  N99.3              70-year-old patient with uterovaginal prolapse but no stress incontinence.  She is cleared for anesthesia and has good hemoglobin.  She understands the risk and complications of lap assisted vaginal hysterectomy with bilateral salpingo-oophorectomy.        Mil Melo MD  [facility] [place]      Instructions     After Visit Summary (Automatic SnapShot taken 4/1/2021)  Additional Documentation    Vitals:    BP  "152/78    Ht 1.651 m (5' 5\")   Wt 76 kg (167 lb 9.6 oz)   Breastfeeding No   BMI 27.89 kg/m    BSA 1.87 m    Flowsheets:    Vitals Reassessment,   NICU VS,   Anthropometrics,   Lactation      Encounter Info:    Billing Info,   History,   Allergies,   Detailed Report      AVS Reports    Date/Time Report Action User   4/1/2021  3:32 PM After Visit Summary Automatically Generated Sherry Naranjo APRN CNP   Encounter Information     Provider Department Encounter # [place]   3/31/2021 10:15 AM iMl Melo MD [facility] Ob/Gyn [id number] [facility]   Reviewed this Encounter     Medications Problems Allergies History   Roxane Easton MA   Reviewed ADL, Alcohol, Drug Use, Family, Medical, Sexual Activity, Surgical, Tobacco   Communicable/Travel screen    Communicable/Travel Screening 9/2/2020 11/4/2020 1/4/2021 1/27/2021 3/31/2021   In the last month, have you been in contact with someone who was confirmed or suspected to have Coronavirus / COVID-19? No / Unsure No / Unsure No / Unsure No / Unsure No / Unsure   Do you have any of the following symptoms? None of these None of these None of these None of these None of these   Have you traveled internationally in the last month? No No No No -   View Complete Flowsheet ...More Data Exists   Orders Placed     None  Medication Changes       None     Medication List   Visit Diagnoses       Prolapse of vaginal vault after hysterectomy     Problem List       "

## 2021-04-17 DIAGNOSIS — Z11.59 ENCOUNTER FOR SCREENING FOR OTHER VIRAL DISEASES: ICD-10-CM

## 2021-04-17 LAB
SARS-COV-2 RNA RESP QL NAA+PROBE: NORMAL
SPECIMEN SOURCE: NORMAL

## 2021-04-17 PROCEDURE — U0003 INFECTIOUS AGENT DETECTION BY NUCLEIC ACID (DNA OR RNA); SEVERE ACUTE RESPIRATORY SYNDROME CORONAVIRUS 2 (SARS-COV-2) (CORONAVIRUS DISEASE [COVID-19]), AMPLIFIED PROBE TECHNIQUE, MAKING USE OF HIGH THROUGHPUT TECHNOLOGIES AS DESCRIBED BY CMS-2020-01-R: HCPCS | Performed by: OBSTETRICS & GYNECOLOGY

## 2021-04-17 PROCEDURE — U0005 INFEC AGEN DETEC AMPLI PROBE: HCPCS | Performed by: OBSTETRICS & GYNECOLOGY

## 2021-04-18 LAB
LABORATORY COMMENT REPORT: NORMAL
SARS-COV-2 RNA RESP QL NAA+PROBE: NEGATIVE
SPECIMEN SOURCE: NORMAL

## 2021-04-19 ENCOUNTER — ANESTHESIA EVENT (OUTPATIENT)
Dept: SURGERY | Facility: CLINIC | Age: 71
DRG: 743 | End: 2021-04-19
Payer: MEDICARE

## 2021-04-19 NOTE — PROGRESS NOTES
PTA medications updated by Medication Scribe prior to surgery via phone call with patient        Comments:    Medication history sources: Patient, Surescripts and H&P  In the past week, patient estimated taking medication this percent of the time: Greater than 90%  Adherence assessment: N/A Not Observed    Significant changes made to the medication list:  Patient reports no longer taking the following meds (med scribe removed from PTA med list): Acetaminophen 500mg, Multi-Vitamins-Minerals      Additional medication history information:   Patient brought own home meds: Lumigan 0.01% SOLN    The information provided in this note is only as accurate as the sources available at the time of update(s)      Prior to Admission medications    Medication Sig Last Dose Taking? Auth Provider   aspirin 81 MG EC tablet Take 81 mg by mouth every morning  more than week at AM Yes Reported, Patient   atorvastatin (LIPITOR) 80 MG tablet Take 80 mg by mouth every evening   at PM Yes Reported, Patient   carvedilol (COREG) 12.5 MG tablet Take 12.5 mg by mouth daily (with dinner) (in addition to morning dose)  at PM Yes Reported, Patient   carvedilol (COREG) 6.25 MG tablet Take 6.25 mg by mouth daily (with breakfast) (In addition to evening dose)  at AM Yes Reported, Patient   lisinopril (ZESTRIL) 20 MG tablet Take 20 mg by mouth every morning   at AM Yes Reported, Patient   loratadine (CLARITIN) 10 MG tablet Take 10 mg by mouth every morning  at AM Yes Reported, Patient   LUMIGAN 0.01 % SOLN ophthalmic solution Place 1 drop into the right eye every evening   at PM Yes Reported, Patient   metroNIDAZOLE (METROGEL) 1 % external gel Apply topically every evening ON FACE  at PM Yes Reported, Patient       Remy Fernández CPhT  Medication Scribe  [facility]

## 2021-04-19 NOTE — RESULT ENCOUNTER NOTE
Mandy      Your covid results are negative.  If further questions please give me a call.    Shelbi Deluca RNC

## 2021-04-20 ENCOUNTER — HOSPITAL ENCOUNTER (INPATIENT)
Facility: CLINIC | Age: 71
LOS: 1 days | Discharge: HOME | DRG: 743 | End: 2021-04-22
Attending: OBSTETRICS & GYNECOLOGY | Admitting: OBSTETRICS & GYNECOLOGY
Payer: MEDICARE

## 2021-04-20 ENCOUNTER — ANESTHESIA (OUTPATIENT)
Dept: SURGERY | Facility: CLINIC | Age: 71
DRG: 743 | End: 2021-04-20
Payer: MEDICARE

## 2021-04-20 DIAGNOSIS — N81.4 UTEROVAGINAL PROLAPSE: ICD-10-CM

## 2021-04-20 PROCEDURE — 88305 TISSUE EXAM BY PATHOLOGIST: CPT | Mod: TC | Performed by: OBSTETRICS & GYNECOLOGY

## 2021-04-20 PROCEDURE — 250N000011 HC RX IP 250 OP 636: Performed by: ANESTHESIOLOGY

## 2021-04-20 PROCEDURE — 250N000009 HC RX 250: Performed by: OBSTETRICS & GYNECOLOGY

## 2021-04-20 PROCEDURE — 0UT74ZZ RESECTION OF BILATERAL FALLOPIAN TUBES, PERCUTANEOUS ENDOSCOPIC APPROACH: ICD-10-PCS | Performed by: OBSTETRICS & GYNECOLOGY

## 2021-04-20 PROCEDURE — 250N000009 HC RX 250: Performed by: NURSE ANESTHETIST, CERTIFIED REGISTERED

## 2021-04-20 PROCEDURE — 250N000011 HC RX IP 250 OP 636: Performed by: OBSTETRICS & GYNECOLOGY

## 2021-04-20 PROCEDURE — 710N000009 HC RECOVERY PHASE 1, LEVEL 1, PER MIN: Performed by: OBSTETRICS & GYNECOLOGY

## 2021-04-20 PROCEDURE — 0UT94ZZ RESECTION OF UTERUS, PERCUTANEOUS ENDOSCOPIC APPROACH: ICD-10-PCS | Performed by: OBSTETRICS & GYNECOLOGY

## 2021-04-20 PROCEDURE — 258N000003 HC RX IP 258 OP 636: Performed by: OBSTETRICS & GYNECOLOGY

## 2021-04-20 PROCEDURE — 0UT24ZZ RESECTION OF BILATERAL OVARIES, PERCUTANEOUS ENDOSCOPIC APPROACH: ICD-10-PCS | Performed by: OBSTETRICS & GYNECOLOGY

## 2021-04-20 PROCEDURE — 258N000001 HC RX 258: Performed by: OBSTETRICS & GYNECOLOGY

## 2021-04-20 PROCEDURE — 360N000077 HC SURGERY LEVEL 4, PER MIN: Performed by: OBSTETRICS & GYNECOLOGY

## 2021-04-20 PROCEDURE — 88305 TISSUE EXAM BY PATHOLOGIST: CPT | Mod: 26 | Performed by: PATHOLOGY

## 2021-04-20 PROCEDURE — 250N000011 HC RX IP 250 OP 636: Performed by: NURSE ANESTHETIST, CERTIFIED REGISTERED

## 2021-04-20 PROCEDURE — 250N000013 HC RX MED GY IP 250 OP 250 PS 637: Performed by: OBSTETRICS & GYNECOLOGY

## 2021-04-20 PROCEDURE — 272N000001 HC OR GENERAL SUPPLY STERILE: Performed by: OBSTETRICS & GYNECOLOGY

## 2021-04-20 PROCEDURE — 0JQC0ZZ REPAIR PELVIC REGION SUBCUTANEOUS TISSUE AND FASCIA, OPEN APPROACH: ICD-10-PCS | Performed by: OBSTETRICS & GYNECOLOGY

## 2021-04-20 PROCEDURE — 250N000025 HC SEVOFLURANE, PER MIN: Performed by: OBSTETRICS & GYNECOLOGY

## 2021-04-20 PROCEDURE — 258N000003 HC RX IP 258 OP 636: Performed by: NURSE ANESTHETIST, CERTIFIED REGISTERED

## 2021-04-20 PROCEDURE — 370N000017 HC ANESTHESIA TECHNICAL FEE, PER MIN: Performed by: OBSTETRICS & GYNECOLOGY

## 2021-04-20 PROCEDURE — 999N000141 HC STATISTIC PRE-PROCEDURE NURSING ASSESSMENT: Performed by: OBSTETRICS & GYNECOLOGY

## 2021-04-20 RX ORDER — ONDANSETRON 2 MG/ML
4 INJECTION INTRAMUSCULAR; INTRAVENOUS EVERY 6 HOURS PRN
Status: DISCONTINUED | OUTPATIENT
Start: 2021-04-20 | End: 2021-04-22 | Stop reason: HOSPADM

## 2021-04-20 RX ORDER — GENTAMICIN SULFATE 80 MG/100ML
80 INJECTION, SOLUTION INTRAVENOUS EVERY 8 HOURS
Status: DISCONTINUED | OUTPATIENT
Start: 2021-04-20 | End: 2021-04-20

## 2021-04-20 RX ORDER — CLINDAMYCIN PHOSPHATE 900 MG/50ML
900 INJECTION, SOLUTION INTRAVENOUS EVERY 8 HOURS
Status: DISCONTINUED | OUTPATIENT
Start: 2021-04-20 | End: 2021-04-20

## 2021-04-20 RX ORDER — HEPARIN SODIUM 1000 [USP'U]/ML
INJECTION, SOLUTION INTRAVENOUS; SUBCUTANEOUS
Status: DISCONTINUED
Start: 2021-04-20 | End: 2021-04-20 | Stop reason: HOSPADM

## 2021-04-20 RX ORDER — FENTANYL CITRATE 50 UG/ML
INJECTION, SOLUTION INTRAMUSCULAR; INTRAVENOUS PRN
Status: DISCONTINUED | OUTPATIENT
Start: 2021-04-20 | End: 2021-04-20

## 2021-04-20 RX ORDER — DEXAMETHASONE SODIUM PHOSPHATE 4 MG/ML
INJECTION, SOLUTION INTRA-ARTICULAR; INTRALESIONAL; INTRAMUSCULAR; INTRAVENOUS; SOFT TISSUE PRN
Status: DISCONTINUED | OUTPATIENT
Start: 2021-04-20 | End: 2021-04-20

## 2021-04-20 RX ORDER — ONDANSETRON 2 MG/ML
4 INJECTION INTRAMUSCULAR; INTRAVENOUS EVERY 30 MIN PRN
Status: DISCONTINUED | OUTPATIENT
Start: 2021-04-20 | End: 2021-04-20 | Stop reason: HOSPADM

## 2021-04-20 RX ORDER — HYDROMORPHONE HCL IN WATER/PF 6 MG/30 ML
0.2 PATIENT CONTROLLED ANALGESIA SYRINGE INTRAVENOUS
Status: DISCONTINUED | OUTPATIENT
Start: 2021-04-20 | End: 2021-04-22 | Stop reason: HOSPADM

## 2021-04-20 RX ORDER — MEPERIDINE HYDROCHLORIDE 25 MG/ML
12.5 INJECTION INTRAMUSCULAR; INTRAVENOUS; SUBCUTANEOUS
Status: DISCONTINUED | OUTPATIENT
Start: 2021-04-20 | End: 2021-04-20 | Stop reason: HOSPADM

## 2021-04-20 RX ORDER — SODIUM CHLORIDE, SODIUM LACTATE, POTASSIUM CHLORIDE, CALCIUM CHLORIDE 600; 310; 30; 20 MG/100ML; MG/100ML; MG/100ML; MG/100ML
INJECTION, SOLUTION INTRAVENOUS CONTINUOUS PRN
Status: DISCONTINUED | OUTPATIENT
Start: 2021-04-20 | End: 2021-04-20

## 2021-04-20 RX ORDER — HYDROMORPHONE HYDROCHLORIDE 1 MG/ML
.3-.5 INJECTION, SOLUTION INTRAMUSCULAR; INTRAVENOUS; SUBCUTANEOUS EVERY 10 MIN PRN
Status: DISCONTINUED | OUTPATIENT
Start: 2021-04-20 | End: 2021-04-20 | Stop reason: HOSPADM

## 2021-04-20 RX ORDER — NALOXONE HYDROCHLORIDE 0.4 MG/ML
0.4 INJECTION, SOLUTION INTRAMUSCULAR; INTRAVENOUS; SUBCUTANEOUS
Status: DISCONTINUED | OUTPATIENT
Start: 2021-04-20 | End: 2021-04-20 | Stop reason: HOSPADM

## 2021-04-20 RX ORDER — SODIUM CHLORIDE, SODIUM LACTATE, POTASSIUM CHLORIDE, AND CALCIUM CHLORIDE .6; .31; .03; .02 G/100ML; G/100ML; G/100ML; G/100ML
IRRIGANT IRRIGATION PRN
Status: DISCONTINUED | OUTPATIENT
Start: 2021-04-20 | End: 2021-04-20 | Stop reason: HOSPADM

## 2021-04-20 RX ORDER — LIDOCAINE HYDROCHLORIDE 20 MG/ML
INJECTION, SOLUTION INFILTRATION; PERINEURAL PRN
Status: DISCONTINUED | OUTPATIENT
Start: 2021-04-20 | End: 2021-04-20

## 2021-04-20 RX ORDER — ONDANSETRON 4 MG/1
4 TABLET, ORALLY DISINTEGRATING ORAL EVERY 6 HOURS PRN
Status: DISCONTINUED | OUTPATIENT
Start: 2021-04-20 | End: 2021-04-22 | Stop reason: HOSPADM

## 2021-04-20 RX ORDER — PROPOFOL 10 MG/ML
INJECTION, EMULSION INTRAVENOUS PRN
Status: DISCONTINUED | OUTPATIENT
Start: 2021-04-20 | End: 2021-04-20

## 2021-04-20 RX ORDER — CLINDAMYCIN PHOSPHATE 900 MG/50ML
900 INJECTION, SOLUTION INTRAVENOUS SEE ADMIN INSTRUCTIONS
Status: DISCONTINUED | OUTPATIENT
Start: 2021-04-20 | End: 2021-04-20 | Stop reason: HOSPADM

## 2021-04-20 RX ORDER — NALOXONE HYDROCHLORIDE 0.4 MG/ML
0.4 INJECTION, SOLUTION INTRAMUSCULAR; INTRAVENOUS; SUBCUTANEOUS
Status: DISCONTINUED | OUTPATIENT
Start: 2021-04-20 | End: 2021-04-22 | Stop reason: HOSPADM

## 2021-04-20 RX ORDER — MAGNESIUM HYDROXIDE 1200 MG/15ML
LIQUID ORAL PRN
Status: DISCONTINUED | OUTPATIENT
Start: 2021-04-20 | End: 2021-04-20 | Stop reason: HOSPADM

## 2021-04-20 RX ORDER — NALOXONE HYDROCHLORIDE 0.4 MG/ML
0.2 INJECTION, SOLUTION INTRAMUSCULAR; INTRAVENOUS; SUBCUTANEOUS
Status: DISCONTINUED | OUTPATIENT
Start: 2021-04-20 | End: 2021-04-20 | Stop reason: HOSPADM

## 2021-04-20 RX ORDER — SODIUM CHLORIDE, SODIUM LACTATE, POTASSIUM CHLORIDE, CALCIUM CHLORIDE 600; 310; 30; 20 MG/100ML; MG/100ML; MG/100ML; MG/100ML
INJECTION, SOLUTION INTRAVENOUS CONTINUOUS
Status: DISCONTINUED | OUTPATIENT
Start: 2021-04-20 | End: 2021-04-20 | Stop reason: HOSPADM

## 2021-04-20 RX ORDER — CLINDAMYCIN PHOSPHATE 900 MG/50ML
900 INJECTION, SOLUTION INTRAVENOUS
Status: COMPLETED | OUTPATIENT
Start: 2021-04-20 | End: 2021-04-20

## 2021-04-20 RX ORDER — ONDANSETRON 4 MG/1
4 TABLET, ORALLY DISINTEGRATING ORAL EVERY 30 MIN PRN
Status: DISCONTINUED | OUTPATIENT
Start: 2021-04-20 | End: 2021-04-20 | Stop reason: HOSPADM

## 2021-04-20 RX ORDER — BUPIVACAINE HYDROCHLORIDE 2.5 MG/ML
INJECTION, SOLUTION EPIDURAL; INFILTRATION; INTRACAUDAL PRN
Status: DISCONTINUED | OUTPATIENT
Start: 2021-04-20 | End: 2021-04-20 | Stop reason: HOSPADM

## 2021-04-20 RX ORDER — FENTANYL CITRATE 50 UG/ML
25-50 INJECTION, SOLUTION INTRAMUSCULAR; INTRAVENOUS EVERY 5 MIN PRN
Status: DISCONTINUED | OUTPATIENT
Start: 2021-04-20 | End: 2021-04-20 | Stop reason: HOSPADM

## 2021-04-20 RX ORDER — GLYCOPYRROLATE 0.2 MG/ML
INJECTION, SOLUTION INTRAMUSCULAR; INTRAVENOUS PRN
Status: DISCONTINUED | OUTPATIENT
Start: 2021-04-20 | End: 2021-04-20

## 2021-04-20 RX ORDER — EPHEDRINE SULFATE 50 MG/ML
INJECTION, SOLUTION INTRAMUSCULAR; INTRAVENOUS; SUBCUTANEOUS PRN
Status: DISCONTINUED | OUTPATIENT
Start: 2021-04-20 | End: 2021-04-20

## 2021-04-20 RX ORDER — OXYCODONE HYDROCHLORIDE 5 MG/1
5-10 TABLET ORAL
Qty: 25 TABLET | Refills: 0 | Status: SHIPPED | OUTPATIENT
Start: 2021-04-20 | End: 2021-05-05

## 2021-04-20 RX ORDER — ACETAMINOPHEN 325 MG/1
975 TABLET ORAL ONCE
Status: COMPLETED | OUTPATIENT
Start: 2021-04-20 | End: 2021-04-20

## 2021-04-20 RX ORDER — LIDOCAINE 40 MG/G
CREAM TOPICAL
Status: DISCONTINUED | OUTPATIENT
Start: 2021-04-20 | End: 2021-04-22 | Stop reason: HOSPADM

## 2021-04-20 RX ORDER — BUPIVACAINE HYDROCHLORIDE 2.5 MG/ML
INJECTION, SOLUTION EPIDURAL; INFILTRATION; INTRACAUDAL
Status: DISCONTINUED
Start: 2021-04-20 | End: 2021-04-20 | Stop reason: HOSPADM

## 2021-04-20 RX ORDER — ONDANSETRON 2 MG/ML
INJECTION INTRAMUSCULAR; INTRAVENOUS PRN
Status: DISCONTINUED | OUTPATIENT
Start: 2021-04-20 | End: 2021-04-20

## 2021-04-20 RX ORDER — NALOXONE HYDROCHLORIDE 0.4 MG/ML
0.2 INJECTION, SOLUTION INTRAMUSCULAR; INTRAVENOUS; SUBCUTANEOUS
Status: DISCONTINUED | OUTPATIENT
Start: 2021-04-20 | End: 2021-04-22 | Stop reason: HOSPADM

## 2021-04-20 RX ORDER — NALOXONE HYDROCHLORIDE 0.4 MG/ML
INJECTION, SOLUTION INTRAMUSCULAR; INTRAVENOUS; SUBCUTANEOUS PRN
Status: DISCONTINUED | OUTPATIENT
Start: 2021-04-20 | End: 2021-04-20

## 2021-04-20 RX ORDER — OXYCODONE HYDROCHLORIDE 5 MG/1
5-10 TABLET ORAL
Status: DISCONTINUED | OUTPATIENT
Start: 2021-04-20 | End: 2021-04-22 | Stop reason: HOSPADM

## 2021-04-20 RX ORDER — NEOSTIGMINE METHYLSULFATE 1 MG/ML
VIAL (ML) INJECTION PRN
Status: DISCONTINUED | OUTPATIENT
Start: 2021-04-20 | End: 2021-04-20

## 2021-04-20 RX ADMIN — OXYCODONE HYDROCHLORIDE 5 MG: 5 TABLET ORAL at 18:26

## 2021-04-20 RX ADMIN — PROPOFOL 50 MG: 10 INJECTION, EMULSION INTRAVENOUS at 09:04

## 2021-04-20 RX ADMIN — ONDANSETRON 4 MG: 2 INJECTION INTRAMUSCULAR; INTRAVENOUS at 09:13

## 2021-04-20 RX ADMIN — Medication 5 MG: at 08:49

## 2021-04-20 RX ADMIN — LIDOCAINE HYDROCHLORIDE 80 MG: 20 INJECTION, SOLUTION INFILTRATION; PERINEURAL at 07:25

## 2021-04-20 RX ADMIN — DEXMEDETOMIDINE HYDROCHLORIDE 12 MCG: 100 INJECTION, SOLUTION INTRAVENOUS at 07:49

## 2021-04-20 RX ADMIN — PHENYLEPHRINE HYDROCHLORIDE 100 MCG: 10 INJECTION INTRAVENOUS at 07:45

## 2021-04-20 RX ADMIN — PROPOFOL 50 MG: 10 INJECTION, EMULSION INTRAVENOUS at 09:00

## 2021-04-20 RX ADMIN — DEXAMETHASONE SODIUM PHOSPHATE 4 MG: 4 INJECTION, SOLUTION INTRA-ARTICULAR; INTRALESIONAL; INTRAMUSCULAR; INTRAVENOUS; SOFT TISSUE at 07:41

## 2021-04-20 RX ADMIN — Medication 10 MG: at 09:18

## 2021-04-20 RX ADMIN — OXYCODONE HYDROCHLORIDE 5 MG: 5 TABLET ORAL at 15:38

## 2021-04-20 RX ADMIN — OXYCODONE HYDROCHLORIDE 5 MG: 5 TABLET ORAL at 21:36

## 2021-04-20 RX ADMIN — CLINDAMYCIN PHOSPHATE 900 MG: 900 INJECTION, SOLUTION INTRAVENOUS at 07:35

## 2021-04-20 RX ADMIN — MIDAZOLAM 1 MG: 1 INJECTION INTRAMUSCULAR; INTRAVENOUS at 07:21

## 2021-04-20 RX ADMIN — Medication 5 MG: at 08:40

## 2021-04-20 RX ADMIN — PROPOFOL 50 MG: 10 INJECTION, EMULSION INTRAVENOUS at 09:07

## 2021-04-20 RX ADMIN — HYDROMORPHONE HYDROCHLORIDE 0.5 MG: 1 INJECTION, SOLUTION INTRAMUSCULAR; INTRAVENOUS; SUBCUTANEOUS at 07:57

## 2021-04-20 RX ADMIN — DEXTROSE AND SODIUM CHLORIDE: 5; 450 INJECTION, SOLUTION INTRAVENOUS at 12:19

## 2021-04-20 RX ADMIN — GENTAMICIN SULFATE 380 MG: 40 INJECTION, SOLUTION INTRAMUSCULAR; INTRAVENOUS at 07:30

## 2021-04-20 RX ADMIN — GLYCOPYRROLATE 0.6 MG: 0.2 INJECTION, SOLUTION INTRAMUSCULAR; INTRAVENOUS at 08:57

## 2021-04-20 RX ADMIN — SODIUM CHLORIDE, POTASSIUM CHLORIDE, SODIUM LACTATE AND CALCIUM CHLORIDE: 600; 310; 30; 20 INJECTION, SOLUTION INTRAVENOUS at 08:41

## 2021-04-20 RX ADMIN — SODIUM CHLORIDE, POTASSIUM CHLORIDE, SODIUM LACTATE AND CALCIUM CHLORIDE: 600; 310; 30; 20 INJECTION, SOLUTION INTRAVENOUS at 07:21

## 2021-04-20 RX ADMIN — Medication 5 MG: at 08:28

## 2021-04-20 RX ADMIN — ROCURONIUM BROMIDE 5 MG: 10 INJECTION INTRAVENOUS at 09:08

## 2021-04-20 RX ADMIN — ROCURONIUM BROMIDE 50 MG: 10 INJECTION INTRAVENOUS at 07:26

## 2021-04-20 RX ADMIN — ACETAMINOPHEN 975 MG: 325 TABLET, FILM COATED ORAL at 06:24

## 2021-04-20 RX ADMIN — FENTANYL CITRATE 100 MCG: 50 INJECTION, SOLUTION INTRAMUSCULAR; INTRAVENOUS at 07:25

## 2021-04-20 RX ADMIN — PHENYLEPHRINE HYDROCHLORIDE 100 MCG: 10 INJECTION INTRAVENOUS at 07:37

## 2021-04-20 RX ADMIN — SUGAMMADEX 150 MG: 100 INJECTION, SOLUTION INTRAVENOUS at 09:13

## 2021-04-20 RX ADMIN — FENTANYL CITRATE 50 MCG: 0.05 INJECTION, SOLUTION INTRAMUSCULAR; INTRAVENOUS at 10:13

## 2021-04-20 RX ADMIN — NEOSTIGMINE METHYLSULFATE 4 MG: 1 INJECTION, SOLUTION INTRAVENOUS at 08:57

## 2021-04-20 RX ADMIN — PROPOFOL 200 MG: 10 INJECTION, EMULSION INTRAVENOUS at 07:25

## 2021-04-20 RX ADMIN — NALOXONE HYDROCHLORIDE 0.2 MG: 0.4 INJECTION, SOLUTION INTRAMUSCULAR; INTRAVENOUS; SUBCUTANEOUS at 09:22

## 2021-04-20 RX ADMIN — FENTANYL CITRATE 50 MCG: 0.05 INJECTION, SOLUTION INTRAMUSCULAR; INTRAVENOUS at 09:40

## 2021-04-20 RX ADMIN — FENTANYL CITRATE 50 MCG: 50 INJECTION, SOLUTION INTRAMUSCULAR; INTRAVENOUS at 09:04

## 2021-04-20 RX ADMIN — OXYCODONE HYDROCHLORIDE 5 MG: 5 TABLET ORAL at 12:18

## 2021-04-20 RX ADMIN — Medication 10 MG: at 08:10

## 2021-04-20 RX ADMIN — FENTANYL CITRATE 50 MCG: 50 INJECTION, SOLUTION INTRAMUSCULAR; INTRAVENOUS at 09:00

## 2021-04-20 ASSESSMENT — LIFESTYLE VARIABLES: TOBACCO_USE: 0

## 2021-04-20 NOTE — PROVIDER NOTIFICATION
Dr. Kwan paged: Patient wondering if home BP and HLD meds can be restarted while in hospital.     1710: Spoke with patient about her BPs being less than 100 when normally between 120 and 140 SBP. Patient is okay with addressing these medications with Dr. Melo when he rounds in the AM.

## 2021-04-20 NOTE — PLAN OF CARE
Arrived from PACU at 1130. A&O x4. VSS w/soft BPs on 1 LPM NC. CMS intact. Lungs clear. BS+, BM-, flatus-. Incisions x4, 3 lap, 1 vaginal. Vaginal packing in place, scant bleeding noted. Laps with steri-strips and band-aids. Tolerating regular diet. Denies N/V. Machuca patent, adequate output. Oxycodone for pain. Up w/1.

## 2021-04-20 NOTE — BRIEF OP NOTE
[facility] Brief Operative Note    Pre-operative diagnosis: Uterovaginal prolapse [N81.4]   Post-operative diagnosis same   Procedure: Procedure(s):  TOTAL LAPAROSCOPIC VAGINAL HYSTERECTOMY , BILATERAL SALPINGO-OOPHORECTOMY  POSTERIOR REPAIR. ENTEROCELE REPAIR   Surgeon(s): Surgeon(s) and Role:     * Mil Melo MD - Primary   Estimated blood loss: 150 mL    Specimens: ID Type Source Tests Collected by Time Destination   A : Uterus, Cervix, Bilateral Fallopian Tubes and Ovaries Tissue Uterus with Bilateral Ovaries and Fallopian Tubes SURGICAL PATHOLOGY EXAM Mil Melo MD 4/20/2021  8:17 AM       Findings: Large enterocele and rectocele

## 2021-04-20 NOTE — ANESTHESIA PREPROCEDURE EVALUATION
Anesthesia Pre-Procedure Evaluation    Patient: Mandy Barnes   MRN: [id number] : 1950        Preoperative Diagnosis: Uterovaginal prolapse [N81.4]   Procedure : Procedure(s):  TOTAL LAPAROSCOPIC VAGINAL HYSTERECTOMY , BILATERAL SALPINGO-OOPHORECTOMY  anterior and posterior repair     Past Medical History:   Diagnosis Date     Allergic rhinitis      Hypertension      Uterine myoma       Past Surgical History:   Procedure Laterality Date     APPENDECTOMY       AS HYSTEROSCOPY W ENDOMETRIAL BX/POLYPECTOMY W/WO D&C       C TOTAL HIP ARTHROPLASTY       EYE SURGERY      cataract removal      Allergies   Allergen Reactions     Penicillins Anaphylaxis     Pollen Extract      Latex Rash      Social History     Tobacco Use     Smoking status: Never Smoker     Smokeless tobacco: Never Used   Substance Use Topics     Alcohol use: Yes     Alcohol/week: 0.0 standard drinks     Comment: occ      Wt Readings from Last 1 Encounters:   21 76.2 kg (168 lb)        Anesthesia Evaluation   Pt has had prior anesthetic.     No history of anesthetic complications       ROS/MED HX  ENT/Pulmonary:    (-) tobacco use, asthma and sleep apnea   Neurologic:       Cardiovascular: Comment: Hx of stress cardiomyopathy  LV function normalized per cardiology note    (+) Dyslipidemia hypertension-----    METS/Exercise Tolerance:     Hematologic:       Musculoskeletal:       GI/Hepatic:    (-) GERD   Renal/Genitourinary:       Endo:    (-) Type II DM and thyroid disease   Psychiatric/Substance Use:       Infectious Disease:       Malignancy:       Other:            Physical Exam    Airway        Mallampati: II   TM distance: > 3 FB   Neck ROM: full     Respiratory Devices and Support         Dental  no notable dental history         Cardiovascular   cardiovascular exam normal          Pulmonary   pulmonary exam normal                OUTSIDE LABS:  CBC:   Lab Results   Component Value Date    WBC 7.0 2008    HGB 15.6  12/29/2008    HCT 45.1 12/29/2008     12/29/2008     BMP:   Lab Results   Component Value Date     12/29/2008    POTASSIUM 4.3 12/29/2008    CHLORIDE 94 12/29/2008    CO2 28 12/29/2008    BUN 8 12/29/2008    CR 0.7 10/17/2015    CR 0.67 12/29/2008     (A) 10/17/2015     (H) 12/29/2008     COAGS:   Lab Results   Component Value Date    INR 1 10/17/2015     POC: No results found for: BGM, HCG, HCGS  HEPATIC:   Lab Results   Component Value Date    ALBUMIN 4.9 12/29/2008    PROTTOTAL 8.2 12/29/2008    ALT 20 12/29/2008    AST 23 12/29/2008    ALKPHOS 80 12/29/2008    BILITOTAL 0.4 12/29/2008     OTHER:   Lab Results   Component Value Date    BERNADINE 10.2 12/29/2008       Anesthesia Plan    ASA Status:  2      Anesthesia Type: General.   Induction: Intravenous.   Maintenance: Balanced.        Consents    Anesthesia Plan(s) and associated risks, benefits, and realistic alternatives discussed. Questions answered and patient/representative(s) expressed understanding.     - Discussed with:  Patient         Postoperative Care    Pain management: IV analgesics, Oral pain medications.   PONV prophylaxis: Ondansetron (or other 5HT-3), Dexamethasone or Solumedrol     Comments:                Alla Fontana

## 2021-04-20 NOTE — ANESTHESIA POSTPROCEDURE EVALUATION
Patient: Mandy Barnes    Procedure(s):  TOTAL LAPAROSCOPIC VAGINAL HYSTERECTOMY , BILATERAL SALPINGO-OOPHORECTOMY  POSTERIOR REPAIR. ENTEROCELE REPAIR    Diagnosis:Uterovaginal prolapse [N81.4]  Diagnosis Additional Information: No value filed.    Anesthesia Type:  General    Note:  Disposition: Outpatient   Postop Pain Control: Uneventful            Sign Out: Well controlled pain   PONV: No   Neuro/Psych: Uneventful            Sign Out: Acceptable/Baseline neuro status   Airway/Respiratory: Uneventful            Sign Out: Acceptable/Baseline resp. status   CV/Hemodynamics: Uneventful            Sign Out: Acceptable CV status   Other NRE: NONE   DID A NON-ROUTINE EVENT OCCUR?          Last vitals:  Vitals:    04/20/21 1300 04/20/21 1330 04/20/21 1432   BP:  96/59 98/56   Pulse:  67 77   Resp: 13 13 16   Temp:      SpO2: 93% 93% 94%       Last vitals prior to Anesthesia Care Transfer:  CRNA VITALS  4/20/2021 0859 - 4/20/2021 0959      4/20/2021             Pulse:  87    Ht Rate:  87    SpO2:  93 %    Resp Rate (set):  10          Electronically Signed By: Alla Fontana  April 20, 2021  3:11 PM

## 2021-04-20 NOTE — ANESTHESIA CARE TRANSFER NOTE
Patient: Mandy Barnes    Procedure(s):  TOTAL LAPAROSCOPIC VAGINAL HYSTERECTOMY , BILATERAL SALPINGO-OOPHORECTOMY  POSTERIOR REPAIR. ENTEROCELE REPAIR    Diagnosis: Uterovaginal prolapse [N81.4]  Diagnosis Additional Information: No value filed.    Anesthesia Type:   General     Note:    Oropharynx: oropharynx clear of all foreign objects  Level of Consciousness: awake  Oxygen Supplementation: face mask  Level of Supplemental Oxygen (L/min / FiO2): 8  Independent Airway: airway patency satisfactory and stable  Dentition: dentition unchanged  Vital Signs Stable: post-procedure vital signs reviewed and stable  Report to RN Given: handoff report given  Patient transferred to: PACU  Comments: Neuromuscular blockade reversed after TOF 4/4, spontaneous respirations, adequate tidal volumes, oropharynx suctioned with soft flexible catheter, extubated atraumatically, extubated with suction, airway patent after extubation.  Oxygen via facemask at 8 liters per minute to PACU. Oxygen tubing connected to wall O2 in PACU, Denise Hugger warmer connected to patient gown, report on patient's clinical status given to PACU RN, RN notified of Narcan administration, questions answered.     Handoff Report: Identifed the Patient, Identified the Reponsible Provider, Reviewed the pertinent medical history, Discussed the surgical course, Reviewed Intra-OP anesthesia mangement and issues during anesthesia, Set expectations for post-procedure period and Allowed opportunity for questions and acknowledgement of understanding      Vitals: (Last set prior to Anesthesia Care Transfer)  CRNA VITALS  4/20/2021 0859 - 4/20/2021 0936      4/20/2021             Pulse:  87    Ht Rate:  87    SpO2:  93 %    Resp Rate (set):  10        Electronically Signed By: WANDY Rowan CRNA  April 20, 2021  9:36 AM

## 2021-04-20 NOTE — PROGRESS NOTES
SPIRITUAL HEALTH SERVICES  [facility]  PRE-SURGICAL VISIT    Had pre-surgical visit with Mandy, a retired [employer] , and her son, Aftab.  Provided spiritual support and prayer.     [facility]   [phone] (pager)  [phone] (office)

## 2021-04-20 NOTE — OR NURSING
Report called to Janet MIRANDA on [id number].  Pt escorted to floor with chart, glasses, and belongings bag.

## 2021-04-20 NOTE — OP NOTE
Procedure Date: 04/20/2021      REASON FOR ADMISSION:  Uterovaginal prolapse with symptomatic enterocele and rectocele.      PROCEDURES PERFORMED:  Laparoscopic-assisted vaginal hysterectomy, bilateral salpingo-oophorectomy, enterocele repair, posterior repair.      OPERATIVE FINDINGS:  The patient had multiple fibroids throughout an enlarged uterus.  Her ovaries were small and bosselated.  She had extensive pelvic congestion syndrome on both sides with very large pelvic veins.      OPERATIVE PROCEDURE IN DETAIL:  After general anesthesia was induced, the patient was placed in the dorsal lithotomy position and prepped and draped in the usual fashion.  A Machuca catheter was placed.  The cervix was grasped and lidocaine injected circumferentially.  A circumferential incision was made around the cervix and a sponge advanced to lift the bladder off the cervix.  A uterine manipulator was placed.      Through a subumbilical incision, the Veress needle was placed and 3 liters of CO2 insufflated.  The laparoscope, trocar and sheath were placed without incident.  Two 5 mm lower quadrant trocars were placed under direct vision without complication.  The above findings were noted.  The infundibulopelvic ligament, round ligament, broad ligament and uterine artery pedicles were doubly cauterized and cut bilaterally.  The bladder peritoneum was released over the sponge.      We went below and removed the sponge and the uterine manipulator.  The anterior and posterior cul-de-sac were sharply entered at this time.  The uterosacral ligaments were clamped, cut, tied with 0 Vicryl and held for enterocele repair.  The enlarged uterus with multiple fibroids was removed at this time and sent to the pathologist.  The tubes and ovaries were attached.      A modified Laird technique was used to cross the uterosacral ligaments.  The posterior vault was closed with 2-0 Vicryl to the apex anteriorly.  There was good anterior support.      A  perineoplasty grayson was injected with lidocaine and excised.  Midline incision was carried all the way to the apex and a very large rectocele was dissected free.  Concentric 2-0 chromic pursestring sutures were used to decompress the large rectocele.  The levators were brought to the midline with 2-0 Vicryl.  The excess vaginal mucosa over the very large bulge of the rectocele was excised.  The defect was closed with interrupted 2-0 Vicryl, all the way to the perineoplasty stitches.  A vaginal pack was placed.      Second-look laparoscopy showed that there were some slow back-bleeding, venous enlargements, on the left that was doubly cauterized.  We inspected the pelvis under fluid and low pressure with no further bleeding.  Copious irrigation was undertaken.  At this point, the decision was made to back out.  All gas was exhausted and instruments were removed.  The incisions were closed with 4-0 Vicryl and Steri-Strips.  The estimated blood loss was 150 mL that was mainly from the slow venous leak while we were working below.  The patient went to the recovery room in satisfactory condition.        She will be observed carefully.  The vaginal pack and Machuca will be removed in the morning and she should be able to void.  Discharge will be dependent on pain control and her bowel waking up.         RAMBO ORTEGA JR, MD             D: 2021   T: 2021   MT: MORGAN      Name:     SILVIA HOFFMAN   MRN:      [id number]        Account:        [id number]   :      1950           Procedure Date: 2021      Document: [id number]

## 2021-04-21 LAB
COPATH REPORT: NORMAL
GLUCOSE BLDC GLUCOMTR-MCNC: 115 MG/DL (ref 70–99)
HGB BLD-MCNC: 11.3 G/DL (ref 11.7–15.7)

## 2021-04-21 PROCEDURE — 250N000013 HC RX MED GY IP 250 OP 250 PS 637: Performed by: OBSTETRICS & GYNECOLOGY

## 2021-04-21 PROCEDURE — 36415 COLL VENOUS BLD VENIPUNCTURE: CPT | Performed by: OBSTETRICS & GYNECOLOGY

## 2021-04-21 PROCEDURE — 82962 GLUCOSE BLOOD TEST: CPT

## 2021-04-21 PROCEDURE — 85018 HEMOGLOBIN: CPT | Performed by: OBSTETRICS & GYNECOLOGY

## 2021-04-21 RX ORDER — CARVEDILOL 12.5 MG/1
12.5 TABLET ORAL
Status: DISCONTINUED | OUTPATIENT
Start: 2021-04-21 | End: 2021-04-22 | Stop reason: HOSPADM

## 2021-04-21 RX ORDER — LISINOPRIL 20 MG/1
20 TABLET ORAL EVERY MORNING
Status: DISCONTINUED | OUTPATIENT
Start: 2021-04-21 | End: 2021-04-22 | Stop reason: HOSPADM

## 2021-04-21 RX ORDER — CARVEDILOL 6.25 MG/1
6.25 TABLET ORAL
Status: DISCONTINUED | OUTPATIENT
Start: 2021-04-21 | End: 2021-04-22 | Stop reason: HOSPADM

## 2021-04-21 RX ORDER — ATORVASTATIN CALCIUM 40 MG/1
80 TABLET, FILM COATED ORAL EVERY EVENING
Status: DISCONTINUED | OUTPATIENT
Start: 2021-04-21 | End: 2021-04-22 | Stop reason: HOSPADM

## 2021-04-21 RX ORDER — LORATADINE 10 MG/1
10 TABLET ORAL EVERY MORNING
Status: DISCONTINUED | OUTPATIENT
Start: 2021-04-21 | End: 2021-04-22 | Stop reason: HOSPADM

## 2021-04-21 RX ORDER — METRONIDAZOLE 7.5 MG/G
GEL TOPICAL EVERY EVENING
Status: DISCONTINUED | OUTPATIENT
Start: 2021-04-21 | End: 2021-04-22 | Stop reason: HOSPADM

## 2021-04-21 RX ORDER — ASPIRIN 81 MG/1
81 TABLET ORAL EVERY MORNING
Status: DISCONTINUED | OUTPATIENT
Start: 2021-04-21 | End: 2021-04-22 | Stop reason: HOSPADM

## 2021-04-21 RX ADMIN — OXYCODONE HYDROCHLORIDE 5 MG: 5 TABLET ORAL at 03:50

## 2021-04-21 RX ADMIN — OXYCODONE HYDROCHLORIDE 5 MG: 5 TABLET ORAL at 10:42

## 2021-04-21 RX ADMIN — OXYCODONE HYDROCHLORIDE 5 MG: 5 TABLET ORAL at 14:08

## 2021-04-21 RX ADMIN — ATORVASTATIN CALCIUM 80 MG: 40 TABLET, FILM COATED ORAL at 17:52

## 2021-04-21 RX ADMIN — OXYCODONE HYDROCHLORIDE 5 MG: 5 TABLET ORAL at 17:22

## 2021-04-21 RX ADMIN — OXYCODONE HYDROCHLORIDE 5 MG: 5 TABLET ORAL at 00:31

## 2021-04-21 RX ADMIN — OXYCODONE HYDROCHLORIDE 5 MG: 5 TABLET ORAL at 20:25

## 2021-04-21 RX ADMIN — LISINOPRIL 20 MG: 20 TABLET ORAL at 17:57

## 2021-04-21 RX ADMIN — LORATADINE 10 MG: 10 TABLET ORAL at 10:40

## 2021-04-21 RX ADMIN — OXYCODONE HYDROCHLORIDE 5 MG: 5 TABLET ORAL at 23:40

## 2021-04-21 RX ADMIN — CARVEDILOL 6.25 MG: 6.25 TABLET, FILM COATED ORAL at 17:52

## 2021-04-21 RX ADMIN — OXYCODONE HYDROCHLORIDE 5 MG: 5 TABLET ORAL at 14:53

## 2021-04-21 RX ADMIN — METRONIDAZOLE: 7.5 GEL TOPICAL at 20:25

## 2021-04-21 RX ADMIN — OXYCODONE HYDROCHLORIDE 5 MG: 5 TABLET ORAL at 07:13

## 2021-04-21 RX ADMIN — ASPIRIN 81 MG: 81 TABLET, DELAYED RELEASE ORAL at 10:40

## 2021-04-21 NOTE — PROGRESS NOTES
Gyn 1  Pt doing well but very exhausted by no sleep overnight. Incisions fine, Vag pack removed. Please restart home meds, ambulate and discontinue nelson when up and stable.  Not ready for discontinue today.

## 2021-04-21 NOTE — PLAN OF CARE
A&O x4, VSS on 2.5-3 L O2, pt desaturated below 90% when O2 was lowered, encouraged IS while awake. Pain well controlled with prn oxy.  Vaginal incision x1,Lap sites X3, CDI. BS hypoactive, LS clear. No vaginal bleeding/ packing present. Machuca patent with adequate output -flatus, -BM.

## 2021-04-22 ENCOUNTER — HOSPITAL ENCOUNTER (EMERGENCY)
Facility: CLINIC | Age: 71
Discharge: HOME | End: 2021-04-22
Attending: EMERGENCY MEDICINE | Admitting: EMERGENCY MEDICINE
Payer: MEDICARE

## 2021-04-22 ENCOUNTER — APPOINTMENT (OUTPATIENT)
Dept: CT IMAGING | Facility: CLINIC | Age: 71
End: 2021-04-22
Attending: EMERGENCY MEDICINE
Payer: MEDICARE

## 2021-04-22 ENCOUNTER — APPOINTMENT (OUTPATIENT)
Dept: GENERAL RADIOLOGY | Facility: CLINIC | Age: 71
End: 2021-04-22
Attending: EMERGENCY MEDICINE
Payer: MEDICARE

## 2021-04-22 VITALS
DIASTOLIC BLOOD PRESSURE: 91 MMHG | OXYGEN SATURATION: 95 % | TEMPERATURE: 98.5 F | RESPIRATION RATE: 18 BRPM | HEART RATE: 99 BPM | SYSTOLIC BLOOD PRESSURE: 125 MMHG

## 2021-04-22 VITALS
WEIGHT: 168 LBS | DIASTOLIC BLOOD PRESSURE: 87 MMHG | OXYGEN SATURATION: 93 % | HEART RATE: 87 BPM | BODY MASS INDEX: 27.96 KG/M2 | TEMPERATURE: 98.7 F | RESPIRATION RATE: 18 BRPM | SYSTOLIC BLOOD PRESSURE: 155 MMHG

## 2021-04-22 DIAGNOSIS — S80.02XA CONTUSION OF LEFT KNEE, INITIAL ENCOUNTER: ICD-10-CM

## 2021-04-22 DIAGNOSIS — S09.90XA CLOSED HEAD INJURY, INITIAL ENCOUNTER: ICD-10-CM

## 2021-04-22 DIAGNOSIS — S00.03XA HEMATOMA OF SCALP, INITIAL ENCOUNTER: ICD-10-CM

## 2021-04-22 DIAGNOSIS — S70.01XA CONTUSION OF RIGHT HIP, INITIAL ENCOUNTER: ICD-10-CM

## 2021-04-22 LAB
ANION GAP SERPL CALCULATED.3IONS-SCNC: 6 MMOL/L (ref 3–14)
BASOPHILS # BLD AUTO: 0.1 10E9/L (ref 0–0.2)
BASOPHILS NFR BLD AUTO: 0.6 %
BUN SERPL-MCNC: 11 MG/DL (ref 7–30)
CALCIUM SERPL-MCNC: 8.6 MG/DL (ref 8.5–10.1)
CHLORIDE SERPL-SCNC: 94 MMOL/L (ref 94–109)
CO2 SERPL-SCNC: 27 MMOL/L (ref 20–32)
CREAT SERPL-MCNC: 0.61 MG/DL (ref 0.52–1.04)
DIFFERENTIAL METHOD BLD: ABNORMAL
EOSINOPHIL # BLD AUTO: 0.2 10E9/L (ref 0–0.7)
EOSINOPHIL NFR BLD AUTO: 1.4 %
ERYTHROCYTE [DISTWIDTH] IN BLOOD BY AUTOMATED COUNT: 14.1 % (ref 10–15)
GFR SERPL CREATININE-BSD FRML MDRD: >90 ML/MIN/{1.73_M2}
GLUCOSE BLDC GLUCOMTR-MCNC: 101 MG/DL (ref 70–99)
GLUCOSE SERPL-MCNC: 113 MG/DL (ref 70–99)
HCT VFR BLD AUTO: 30.1 % (ref 35–47)
HGB BLD-MCNC: 9.9 G/DL (ref 11.7–15.7)
IMM GRANULOCYTES # BLD: 0.1 10E9/L (ref 0–0.4)
IMM GRANULOCYTES NFR BLD: 0.8 %
LYMPHOCYTES # BLD AUTO: 1.3 10E9/L (ref 0.8–5.3)
LYMPHOCYTES NFR BLD AUTO: 12 %
MCH RBC QN AUTO: 33.9 PG (ref 26.5–33)
MCHC RBC AUTO-ENTMCNC: 32.9 G/DL (ref 31.5–36.5)
MCV RBC AUTO: 103 FL (ref 78–100)
MONOCYTES # BLD AUTO: 1.4 10E9/L (ref 0–1.3)
MONOCYTES NFR BLD AUTO: 13 %
NEUTROPHILS # BLD AUTO: 7.8 10E9/L (ref 1.6–8.3)
NEUTROPHILS NFR BLD AUTO: 72.2 %
NRBC # BLD AUTO: 0 10*3/UL
NRBC BLD AUTO-RTO: 0 /100
PLATELET # BLD AUTO: 170 10E9/L (ref 150–450)
POTASSIUM SERPL-SCNC: 3.7 MMOL/L (ref 3.4–5.3)
RBC # BLD AUTO: 2.92 10E12/L (ref 3.8–5.2)
SODIUM SERPL-SCNC: 127 MMOL/L (ref 133–144)
WBC # BLD AUTO: 10.8 10E9/L (ref 4–11)

## 2021-04-22 PROCEDURE — 120N000001 HC R&B MED SURG/OB

## 2021-04-22 PROCEDURE — 96361 HYDRATE IV INFUSION ADD-ON: CPT | Mod: 59

## 2021-04-22 PROCEDURE — 70450 CT HEAD/BRAIN W/O DYE: CPT

## 2021-04-22 PROCEDURE — 96360 HYDRATION IV INFUSION INIT: CPT | Mod: 59

## 2021-04-22 PROCEDURE — 99285 EMERGENCY DEPT VISIT HI MDM: CPT | Mod: 25

## 2021-04-22 PROCEDURE — 72125 CT NECK SPINE W/O DYE: CPT

## 2021-04-22 PROCEDURE — 258N000003 HC RX IP 258 OP 636: Performed by: EMERGENCY MEDICINE

## 2021-04-22 PROCEDURE — 85025 COMPLETE CBC W/AUTO DIFF WBC: CPT | Performed by: EMERGENCY MEDICINE

## 2021-04-22 PROCEDURE — 12002 RPR S/N/AX/GEN/TRNK2.6-7.5CM: CPT

## 2021-04-22 PROCEDURE — 73502 X-RAY EXAM HIP UNI 2-3 VIEWS: CPT

## 2021-04-22 PROCEDURE — 71045 X-RAY EXAM CHEST 1 VIEW: CPT

## 2021-04-22 PROCEDURE — 80048 BASIC METABOLIC PNL TOTAL CA: CPT | Performed by: EMERGENCY MEDICINE

## 2021-04-22 PROCEDURE — 999N001017 HC STATISTIC GLUCOSE BY METER IP

## 2021-04-22 PROCEDURE — 250N000013 HC RX MED GY IP 250 OP 250 PS 637: Performed by: OBSTETRICS & GYNECOLOGY

## 2021-04-22 RX ORDER — ACETAMINOPHEN 160 MG
100 TABLET,DISINTEGRATING ORAL ONCE
Status: DISCONTINUED | OUTPATIENT
Start: 2021-04-22 | End: 2021-04-22 | Stop reason: HOSPADM

## 2021-04-22 RX ADMIN — ASPIRIN 81 MG: 81 TABLET, DELAYED RELEASE ORAL at 08:20

## 2021-04-22 RX ADMIN — OXYCODONE HYDROCHLORIDE 5 MG: 5 TABLET ORAL at 04:50

## 2021-04-22 RX ADMIN — SODIUM CHLORIDE 1000 ML: 9 INJECTION, SOLUTION INTRAVENOUS at 15:11

## 2021-04-22 RX ADMIN — CARVEDILOL 6.25 MG: 6.25 TABLET, FILM COATED ORAL at 08:20

## 2021-04-22 RX ADMIN — LORATADINE 10 MG: 10 TABLET ORAL at 08:21

## 2021-04-22 RX ADMIN — OXYCODONE HYDROCHLORIDE 5 MG: 5 TABLET ORAL at 08:20

## 2021-04-22 ASSESSMENT — ENCOUNTER SYMPTOMS
WOUND: 1
ARTHRALGIAS: 1
NECK PAIN: 0

## 2021-04-22 NOTE — ED NOTES
Patient complains of feeling lightheaded while walking in hr room. Patient encouraged to sit down in wheel chair and given crackers. Patient states feeling better and just wants to go home. Patient denies any further needs.

## 2021-04-22 NOTE — PROGRESS NOTES
Pt alert and oriented, dressed and awaiting MD for discharge, tolerates diet, pain well controled, up independently, voiding well, passing flatus,

## 2021-04-22 NOTE — UTILIZATION REVIEW
"  Admission Status; Secondary Review Determination       As part of the [place] Utilization review plan, a self-audit is done on Medicare inpatient admission with less than 2 midnights stay. The 2014 IPPS Final Rule allows outpatient billing in the event that a hospital determines that an inpatient admission was not medically necessary under utilization review process.          (x) Outpatient status would be Appropriate- Short Stay- Post discharge review.     RATIONALE FOR DETERMINATION   69 years old woman who underwent a TOTAL LAPAROSCOPIC VAGINAL HYSTERECTOMY , BILATERAL SALPINGO-OOPHORECTOMY . Patient has Medicare and the procedure is not on the CMS inpatient list. No documented complications or unexpected recovery.  Documentation on postop day 1:\"Gyn 1  Pt doing well but very exhausted by no sleep overnight. Incisions fine, Vag pack removed. \"  Patient was in outpatient status, this morning a an inpatient order was entered and shortly after patient was discharged.  This patient has Medicare  This account and medical record number for a Medicare beneficiary was an inpatient (from order time) short stay (<2 midnights) and didn't meet medical necessity for inpatient admission. We recommend billing outpatient services based on the severity of illness, intensity of service provided and the inpatient length of stay.  Please contact me within one week of receiving this letter only if you disagree with this determination. If you concur, no further action is needed.          The information on this document is developed by the utilization review team in order for the business office to ensure compliance.  This only denotes the appropriateness of proper admission status and does not reflect the quality of care rendered.         The definitions of Inpatient Status and Observation Status used in making the determination above are those provided in the CMS Coverage Manual, Chapter 1 and Chapter 6, section 70.4.    "   Sincerely,       ALL BRISENO MD    System Medical Director  Utilization  Management  [facility].

## 2021-04-22 NOTE — ED PROVIDER NOTES
History   Chief Complaint:  Fall and Head Injury       HPI   Mandy Barnes is a 70 year old female with history of NSTEMI, hypertension, hyperlipidemia, and coronary artery disease who presents with fall and head injury. The patient states that she was discharged from the hospital today after laparoscopic hysterectomy and rectocele procedure performed on 4/20/21 by Dr. Melo, and when she and her son were walking up the five or six cement stairs up to her condo, her shoe caught the edge of the stair, and she lost her balance and fell. The son was there with her and witnessed the fall, and he says that the patient fell onto her right side and ended up face down, and he believes she lost consciousness for a few minutes. He says the right side of her head started bleeding almost immediately, and he called EMS. The patient does note that she has not taken her 81 mg aspirin recently, as they had her stop taking it before the surgery. She also notes pain to her right hip. She does not normally wear oxygen at home. She denies neck pain.    Review of Systems   Musculoskeletal: Positive for arthralgias (right hip). Negative for neck pain.   Skin: Positive for wound (right side of head).   Neurological: Positive for syncope.   All other systems reviewed and are negative.      Allergies:  Penicillins  Pollen Extract  Latex      Medications:  Aspirin 81  Atorvastatin  Carvedilol  Lisinopril  Loratadine      Past Medical History:    Allergic rhinitis  Hypertension  Uterine myoma  Uterovaginal prolapse  Anemia  DJD of left hip  Hyperlipidemia  Tobacco abuse  Non-occlusive coronary artery disease  Stress-induced cardiomyopathy  NSTEMI  Angina pectoris       Past Surgical History:    Appendectomy  Hysteroscopy with endometrial bx/polypectomy  Total hip arthroplasty  Colporrhaphy posterior  Cataract removal  Laparoscopic assisted hysterectomy vaginal, bilateral salpingo-oophorectomy, combined  Uterine fibroid surgery       Family  History:    Hypertension  Hyperlipidemia  Coronary artery disease      Social History:  Accompanied by son.  Recently discharged from hospital.    Physical Exam     Patient Vitals for the past 24 hrs:   BP Temp Temp src Pulse Resp SpO2   04/22/21 1400 (!) 125/91 -- -- 99 -- 95 %   04/22/21 1338 -- -- -- -- 18 --   04/22/21 1336 -- 98.5  F (36.9  C) Oral -- -- --   04/22/21 1332 -- -- -- -- -- (!) 87 %   04/22/21 1330 (!) 146/80 -- -- 97 -- --       Physical Exam  General: Resting comfortably on the gurney  Head:  Large right temporal hematoma with matted blood. No active bleeding at this time. A large amount of blood is noted to the dressing that was applied pre-hospital.  Scalp:  Tennis ball sized scalp hematoma to the right parietal area which is currently clotted off. After local anesthesia I opened up the laceration edges and extruded a large amount of clot. There is a 4 cm laceration, slightly jagged, to this area with ongoing bleeding. This will require suture repair.   Eyes:  The pupils are equal, round, and reactive to light    There is no nystagmus    Extraocular muscles are intact    Conjunctivae and sclerae are normal  ENT:    The nose is normal    Pinnae are normal    The oropharynx is normal    Uvula is in the midline  Neck:  Normal range of motion    There is no rigidity noted    There is no midline cervical spine pain/tenderness    Trachea is in the midline    No mass is detected    No midline neck pain  CV:  Regular rate and underlying rhythm     Normal S1/S2, no S3/S4    No pathological murmur detected  Resp:  Lungs are clear    There is no tachypnea    Non-labored    No rales    No wheezing   GI:  Abdomen is soft, there is no rigidity    No distension    No tympani    No rebound tenderness     Significant post op bruising typical for the procedure recently performed. No bleeding from laparoscopic scars.  MS:  Normal muscular tone    Symmetric motor strength    No major joint effusions    No  asymmetric leg swelling, no calf tenderness    Large bruise to the right hip area.     No difficulty moving the hip.     No shortening or malrotation.    Left knee mild contusion. Normal ROM  Skin:  No rash or acute skin lesions noted  Neuro: Speech is normal and fluent  Psych:  Awake. Alert.      Normal affect.  Appropriate interactions.  Lymph: No anterior cervical lymphadenopathy noted      Emergency Department Course     Imaging:  CT Head w/o Contrast  Large right parietal scalp hematoma. Diffuse cerebral  volume loss and cerebral white matter changes consistent with chronic  small vessel ischemic disease. No evidence for acute intracranial  pathology.    TOM PENNINGTON MD  Reading per radiology    Cervical spine CT w/o contrast  There is minimal degenerative anterolisthesis of C4 upon  C5 and C7 upon T1. Alignment of the cervical vertebrae is otherwise  normal. Vertebral body heights of the cervical spine are normal.  Craniocervical alignment is normal. There are no fractures of the  cervical spine.  Degenerative endplate spurring at C5-C6 and C6-C7.  Mild-moderate facet arthropathy on the left at all levels of the  cervical spine. Mild degenerative spinal canal narrowing at C5-C6 and  C6-C7. No other significant degenerative spinal canal narrowing of the  cervical spine. No prevertebral soft tissue swelling.    TOM PENNINGTON MD  Reading per radiology    XR Pelvis w Hip Right 1 View  Mild to moderate right hip degenerative changes. No  evidence of fracture. Left hip arthroplasty.  Reading per radiology    XR Chest 1 View  No airspace consolidation, pneumothorax, or pleural  effusion.    NANCY MATTHEW MD  Reading per radiology      Laboratory:  CBC: WBC 10.8, HGB 9.9 (L),   BMP:  (L), Glucose 113 (H) o/w WNL (Creatinine 0.61)        Procedures      Laceration Repair        LACERATION:  A simple clean 4 cm laceration.      LOCATION:  Right parietal scalp      ANESTHESIA:  Local using 0.25%  Bupivacaine with Epinephrine total of 10 mLs      PREPARATION:  Irrigation and Scrubbing with Normal Saline and Shur Clens      DEBRIDEMENT:  no debridement      CLOSURE:  Wound was closed with One Layer.  Skin closed with 4 cm of 3.0 Ethylon using running locked hemostatic sutures. Bleeding was controlled.      Emergency Department Course:    Reviewed:  I reviewed nursing notes, vitals, past medical history and care everywhere    Assessments:  1405 I obtained history and examined the patient as noted above.   1551 I rechecked the patient and explained findings. I also numbed her wound in preparation for repair.   1607 I performed the laceration procedure as noted above. There was heavy bleeding coming from the wound.    Interventions:  1511 NS 1000 mL IV    Disposition:  The patient was discharged to home.     Impression & Plan       Medical Decision Making:  This patient presents after a mechanical fall as noted above.  She landed onto her right parietal area suffering a large right parietal scalp hematoma and laceration with significant wound edge bleeding.  Patient had just been discharged from the hospital as noted above.  Patient's hemoglobin is at approximately 10.  Her sodium is ever so slightly lower than normal at 127.  She is not on a diuretic at this time.  She was hydrated with saline in the emergency department.  This bears rechecking.  There was no indication of syncope, as this was a witnessed fall by her son.  She suffered a contusion to the right hip there is no evidence of fracture.  There is normal postoperative bruising to the abdomen.  Nothing acute here.  There is a mild contusion to the left anterior knee, no radiography is needed.  Patient likely lost at least a half a unit of blood from her scalp wound possibly as much as a unit although it is difficult to determine because the prehospital blood loss is not certain.  The patient had a hemostatic running locked suture placed as the wound  edges were bleeding.  There was no discrete arteriolar or venous bleed that I could appreciate.  The suture was effective.  We will leave this in for 14 days.  There is no evidence of fracture or intracranial hemorrhage.      It appears that the discharge hemoglobin was 11.3.  So there has been at least a 1 g drop in the hemoglobin.  This is likely from the scalp wound.      Diagnosis:    ICD-10-CM    1. Closed head injury, initial encounter  S09.90XA    2. Hematoma of scalp, initial encounter  S00.03XA    3. Contusion of right hip, initial encounter  S70.01XA    4. Contusion of left knee, initial encounter  S80.02XA          Scribe Disclosure:  I, Shaila Hooks, am serving as a scribe at 2:05 PM on 4/22/2021 to document services personally performed by Eben Velasquez MD based on my observations and the provider's statements to me.      Eben Velasquez MD  04/22/21 0394

## 2021-04-22 NOTE — ED NOTES
Bed: ED11  Expected date:   Expected time:   Means of arrival:   Comments:  Carla - 70 F fall head injury eta 1315

## 2021-04-22 NOTE — ED NOTES
Hypoxic in room air (85%), O2 initiated. Per EMS significant blood loss in the scene. Bruise noted to the L lower flank, seems recent. No tenderness, soft to the touch.

## 2021-04-22 NOTE — DISCHARGE INSTRUCTIONS
Discharge Instructions  Head Injury    You have been seen today for a head injury. Your evaluation included a history and physical examination. You may have had a CT (CAT) scan performed, though most head injuries do not require a scan. Based on this evaluation, your provider today does not feel that your head injury is serious.    Generally, every Emergency Department visit should have a follow-up clinic visit with either a primary or a specialty clinic/provider. Please follow-up as instructed by your emergency provider today.  Return to the Emergency Department if:  You are confused or you are not acting right.  Your headache gets worse or you start to have a really bad headache even with your recommended treatment plan.  You vomit (throw up) more than once.  You have a seizure.  You have trouble walking.  You have weakness or paralysis (cannot move) in an arm or a leg.  You have blood or fluid coming from your ears or nose.  You have new symptoms or anything that worries you.    Sleeping:  It is okay for you to sleep, but someone should wake you up if instructed by your provider, and someone should check on you at your usual time to wake up.     Activity:  Do not drive for at least 24 hours.  Do not drive if you have dizzy spells or trouble concentrating, or remembering things.  Do not return to any contact sports until cleared by your regular provider.     MORE INFORMATION:    Concussion:  A concussion is a minor head injury that may cause temporary problems with the way the brain works. Although concussions are important, they are generally not an emergency or a reason that a person needs to be hospitalized. Some concussion symptoms include confusion, amnesia (forgetful), nausea (sick to your stomach) and vomiting (throwing up), dizziness, fatigue, memory or concentration problems, irritability and sleep problems. For most people, concussions are mild and temporary but some will have more severe and persistent  symptoms that require on-going care and treatment.  CT Scans: Your evaluation today may have included a CT scan (CAT scan) to look for things like bleeding or a skull fracture (broken bone).  CT scans involve radiation and too many CT scans can cause serious health problems like cancer, especially in children.  Because of this, your provider may not have ordered a CT scan today if they think you are at low risk for a serious or life threatening problem.    If you were given a prescription for medicine here today, be sure to read all of the information (including the package insert) that comes with your prescription.  This will include important information about the medicine, its side effects, and any warnings that you need to know about.  The pharmacist who fills the prescription can provide more information and answer questions you may have about the medicine.  If you have questions or concerns that the pharmacist cannot address, please call or return to the Emergency Department.     Remember that you can always come back to the Emergency Department if you are not able to see your regular provider in the amount of time listed above, if you get any new symptoms, or if there is anything that worries you.      Rest. Sutures need to be removed in 14 days. This is one long running suture. You may shower. You should have your sodium rechecked in 1 to 2 weeks by primary care as it is mildly low at 127.

## 2021-04-22 NOTE — PROGRESS NOTES
Gyn 2 Pt doing well and ready for discontinue to home. RTC 2 weeks. Soft diet. No lifting x 8 weeks

## 2021-04-22 NOTE — PLAN OF CARE
A&O x4, VSS on 1/2L O2. Pain well controlled with prn oxy. 3 lap incisions WDL. Regular diet well tolerated. LS clear, BS hypoactive. +void, +flatus, -BM. Up Ind. Looking forward to discharge today.

## 2021-04-22 NOTE — ED TRIAGE NOTES
"Patient walking up stairs outside condo, fell backwards on 3rd/4th step and hit head. Large amount of blood per EMS, son says patient was \"out: for several seconds. Patient complains of head and left hip pain. Patient was just discharged today from hospital, post hysterectomy and hernia repair/.  "

## 2021-04-22 NOTE — PLAN OF CARE
Care Plan Nursing Note    Patient Information  Name: Mandy Barnes  Age: 70 year old    Patient Assessment   DATE & TIME: 04/21/21,    Cognitive Concerns/ Orientation : A & O times four. Anxious at times  BEHAVIOR & AGGRESSION TOOL COLOR: green  ABNL VS/O2: VSS, 2 liters NC,  oxygen sat WDL  MOBILITY: assist of one  PAIN MANAGMENT: incisional, pain med per PRN  DIET: regular  BOWEL/BLADDER: nelson was dc'd this shift and she was able to void afterward. Passing gas  ABNL LAB/BG: hgb 11.3  SKIN: three lab site, steri-strips  intact  TESTS/PROCEDURES: N/A  D/C DATE: Possibly tomorrow  OTHER IMPORTANT INFO:  De-sat with room air. Encouraged use of IS, ambulation. Continue to monitor.   Shaila Vasquez RN

## 2021-04-22 NOTE — PLAN OF CARE
Discharge instructions including incision care, follow up appointments, and pain management with oxycodone prescription discussed with the patient at the bedside. IV removed and belongings returned to patient. All questions and concerns discussed.

## 2021-04-26 ENCOUNTER — TELEPHONE (OUTPATIENT)
Dept: OBGYN | Facility: CLINIC | Age: 71
End: 2021-04-26

## 2021-04-26 DIAGNOSIS — N81.4 UTEROVAGINAL PROLAPSE: Primary | ICD-10-CM

## 2021-04-26 DIAGNOSIS — D50.8 OTHER IRON DEFICIENCY ANEMIA: ICD-10-CM

## 2021-04-26 DIAGNOSIS — E87.1 LOW SODIUM LEVELS: ICD-10-CM

## 2021-04-26 DIAGNOSIS — Z48.816 SURGICAL AFTERCARE, GENITOURINARY SYSTEM: Primary | ICD-10-CM

## 2021-04-26 DIAGNOSIS — Z09 POSTOP CHECK: ICD-10-CM

## 2021-04-26 NOTE — TELEPHONE ENCOUNTER
4/20/21 w Dr Melo      Procedure Laterality Anesthesia   TOTAL LAPAROSCOPIC VAGINAL HYSTERECTOMY , BILATERAL SALPINGO-OOPHORECTOMY N/A General   POSTERIOR REPAIR. ENTEROCELE REPAIR          Discharged from hospital Thursday 4/22  Fell down steps when she returned from hospital  Taken to ER via ambulance and spent 5 hrs there. All r/o - no broken bones, no head bleeds, does have stiches in head from laceration - needs removed at time of her post op apt 5/5 if possible  NO bleeding from incision or vagina after fall.    ER doctor did recommend Dr Melo recheck her sodium and hgb at next visit as both low    Pt did have red vaginal spotting over the weekend, no flow or blood on underwear, just Toilet paper.  Reassured to monitor and call w heavier bleeding.    Constipated as well - bloating, abdominal pressure.  Just took two colace today. Recommended stool softener, fiber diet, fluids, prune juice to get things moving as directly impacting the pressure. Add ES Tylenol 1000 mg every 6 hrs to push off oxycodone use as it is constipating. Heat as well.    Taking oxycodone sparingly and has a enough to get her through the next few days. Just feeling a lot of abdominal pressure. She will try the ES Tyl but asking if she can get extra oxycodone tabs, not many needed she feels.    Routing to Dr Melo an update on her fall/status and to advise when returns to office tomorrow.  Rx extension for oxy?    Sherry Paul RN on 4/26/2021 at 1:18 PM

## 2021-04-26 NOTE — TELEPHONE ENCOUNTER
Patient went to the ER this weekend because she fell down the cement stairs. Recently has a surgery and is scheduled for her post on po May 5th. Please return call.

## 2021-04-27 NOTE — TELEPHONE ENCOUNTER
Needs to use ibuprofen/tylenol. No extra oxy as she is struggling with constipation.     Labs placed for post op appt.

## 2021-04-27 NOTE — TELEPHONE ENCOUNTER
Called pt w response.  Started vaginal bleeding last evening - red/pink in color.  Not heavy. She is not concerned. Just reiterated taking it easy, no lifting. She is resting.  She will focus on moving stool. She will call for an apt if pain does not improve or any concerns.    Pt verbalized understanding, in agreement with plan, and voiced no further questions.  Sherry Paul RN on 4/27/2021 at 9:22 AM     CVA (cerebral vascular accident)    Dementia    Diverticulosis    High cholesterol    Hypertension    Nephrolithiasis    Sick sinus syndrome

## 2021-04-27 NOTE — DISCHARGE SUMMARY
The patient is a 70-year-old who was seen for genitourinary prolapse which was predominantly enterocele and rectocele.  On April 20 a laparoscopic-assisted vaginal hysterectomy with enterocele repair rectocele repair and bilateral salpingo-oophorectomy was performed.  Her pathology was benign.  The patient was able to void postoperatively and was discharged on the second postop day.  She will return to the office in 2 weeks for postoperative check.  She is given some oxycodone as needed for discomfort.  She is asked to call for any fever chills change in bowel or bladder function this to be signed Mil Melo thank you

## 2021-04-28 NOTE — PROGRESS NOTES
"  SUBJECTIVE:                                                   Mandy Barnes is a 70 year old female who presents to clinic today for the following health issue(s):  No chief complaint on file.      Additional information: ***    HPI:  ***    No LMP recorded. Patient is postmenopausal..     Patient {is/is not:796759::\"is\"} sexually active, .  Using {PLC CONTRACEPTION:645523} for contraception.    reports that she has never smoked. She has never used smokeless tobacco.  {Tobacco Cessation -- Delete if patient is a non-smoker:392573}  STD testing offered?  {PLC GC/CHLAMYDIA:752098}    Health maintenance updated:  {yes no:798896}    Today's PHQ-2 Score:   PHQ-2 (  [employer]) 2020   Q1: Little interest or pleasure in doing things 0   Q2: Feeling down, depressed or hopeless 0   PHQ-2 Score 0     Today's PHQ-9 Score: No flowsheet data found.  Today's LOKI-7 Score: No flowsheet data found.    Problem list and histories reviewed & adjusted, as indicated.  Additional history: as documented.    Patient Active Problem List   Diagnosis     Uterovaginal prolapse     Past Surgical History:   Procedure Laterality Date     APPENDECTOMY       AS HYSTEROSCOPY W ENDOMETRIAL BX/POLYPECTOMY W/WO D&C       C TOTAL HIP ARTHROPLASTY       COLPORRHAPHY POSTERIOR N/A 2021    Procedure: POSTERIOR REPAIR. ENTEROCELE REPAIR;  Surgeon: Mil Melo MD;  Location:  OR     EYE SURGERY      cataract removal     LAPAROSCOPIC ASSISTED HYSTERECTOMY VAGINAL, BILATERAL SALPINGO-OOPHORECTOMY, COMBINED N/A 2021    Procedure: TOTAL LAPAROSCOPIC VAGINAL HYSTERECTOMY , BILATERAL SALPINGO-OOPHORECTOMY;  Surgeon: Mil Melo MD;  Location:  OR      Social History     Tobacco Use     Smoking status: Never Smoker     Smokeless tobacco: Never Used   Substance Use Topics     Alcohol use: Yes     Alcohol/week: 0.0 standard drinks     Comment: occ      Problem (# of Occurrences) Relation (Name,Age of Onset)    Cancer " "(1) Brother    Coronary Artery Disease (1) Father    Hyperlipidemia (1) Father    Hypertension (3) Mother, Sister, Sister    No Known Problems (5) Maternal Grandmother, Maternal Grandfather, Paternal Grandmother, Paternal Grandfather, Other            Current Outpatient Medications   Medication Sig     aspirin 81 MG EC tablet Take 81 mg by mouth every morning      atorvastatin (LIPITOR) 80 MG tablet Take 80 mg by mouth every evening      carvedilol (COREG) 12.5 MG tablet Take 12.5 mg by mouth daily (with dinner) (in addition to morning dose)     carvedilol (COREG) 6.25 MG tablet Take 6.25 mg by mouth daily (with breakfast) (In addition to evening dose)     lisinopril (ZESTRIL) 20 MG tablet Take 20 mg by mouth every morning      loratadine (CLARITIN) 10 MG tablet Take 10 mg by mouth every morning     LUMIGAN 0.01 % SOLN ophthalmic solution Place 1 drop into the right eye every evening      metroNIDAZOLE (METROGEL) 1 % external gel Apply topically every evening ON FACE     oxyCODONE (ROXICODONE) 5 MG tablet Take 1-2 tablets (5-10 mg) by mouth every 3 hours as needed for pain (Moderate to Severe)     No current facility-administered medications for this visit.      Allergies   Allergen Reactions     Penicillins Anaphylaxis     Pollen Extract      Latex Rash       ROS:  {[facility] ROSGYN:177964::\"12 point review of systems negative other than symptoms noted below or in the HPI.\"}  {ROS - :819499::\"No urinary frequency or dysuria, bladder or kidney problems\"}      OBJECTIVE:     There were no vitals taken for this visit.  There is no height or weight on file to calculate BMI.    Exam:  {[facility] EXAM CHOICES:847027}     In-Clinic Test Results:  No results found for this or any previous visit (from the past 24 hour(s)).    ASSESSMENT/PLAN:                                                      No diagnosis found.    There are no Patient Instructions on file for this visit.    ***    Mil Melo MD  [facility]

## 2021-04-29 NOTE — TELEPHONE ENCOUNTER
Taking colace everyday and loads of water  Stopped the oxycodone Tuesday and still constipated. Also eating prunes.  Cannot remember the last time she had a bowel movement    With her type of enterocele repair, dr Melo does not recommend rectal suppositories - recommends Miralax and can take MOM as well to produce more immediate BM.    Pt advised on MOM for more immediate relief and starting miralax as well. Keep up w fluids and prune juice. Call if no relief. Pt verbalized understanding, in agreement with plan, and voiced no further questions.    Sherry Paul RN on 4/29/2021 at 12:41 PM

## 2021-05-05 ENCOUNTER — OFFICE VISIT (OUTPATIENT)
Dept: OBGYN | Facility: CLINIC | Age: 71
End: 2021-05-05
Payer: MEDICARE

## 2021-05-05 VITALS
DIASTOLIC BLOOD PRESSURE: 72 MMHG | SYSTOLIC BLOOD PRESSURE: 148 MMHG | WEIGHT: 164.2 LBS | BODY MASS INDEX: 27.36 KG/M2 | HEIGHT: 65 IN

## 2021-05-05 DIAGNOSIS — Z09 POSTOP CHECK: ICD-10-CM

## 2021-05-05 DIAGNOSIS — D50.8 OTHER IRON DEFICIENCY ANEMIA: Primary | ICD-10-CM

## 2021-05-05 DIAGNOSIS — Z48.816 SURGICAL AFTERCARE, GENITOURINARY SYSTEM: ICD-10-CM

## 2021-05-05 DIAGNOSIS — E87.1 LOW SODIUM LEVELS: ICD-10-CM

## 2021-05-05 LAB
DIFFERENTIAL METHOD BLD: ABNORMAL
EOSINOPHIL # BLD AUTO: 0.3 10E9/L (ref 0–0.7)
EOSINOPHIL NFR BLD AUTO: 2 %
ERYTHROCYTE [DISTWIDTH] IN BLOOD BY AUTOMATED COUNT: 14.9 % (ref 10–15)
HCT VFR BLD AUTO: 28.6 % (ref 35–47)
HGB BLD-MCNC: 8.8 G/DL (ref 11.7–15.7)
LYMPHOCYTES # BLD AUTO: 4.1 10E9/L (ref 0.8–5.3)
LYMPHOCYTES NFR BLD AUTO: 29 %
MCH RBC QN AUTO: 32.6 PG (ref 26.5–33)
MCHC RBC AUTO-ENTMCNC: 30.8 G/DL (ref 31.5–36.5)
MCV RBC AUTO: 106 FL (ref 78–100)
MONOCYTES # BLD AUTO: 1.1 10E9/L (ref 0–1.3)
MONOCYTES NFR BLD AUTO: 8 %
NEUTROPHILS # BLD AUTO: 8.6 10E9/L (ref 1.6–8.3)
NEUTROPHILS NFR BLD AUTO: 61 %
PLATELET # BLD AUTO: 477 10E9/L (ref 150–450)
PLATELET # BLD EST: ABNORMAL 10*3/UL
RBC # BLD AUTO: 2.7 10E12/L (ref 3.8–5.2)
RBC MORPH BLD: ABNORMAL
WBC # BLD AUTO: 14.1 10E9/L (ref 4–11)

## 2021-05-05 PROCEDURE — 36415 COLL VENOUS BLD VENIPUNCTURE: CPT | Performed by: OBSTETRICS & GYNECOLOGY

## 2021-05-05 PROCEDURE — 84295 ASSAY OF SERUM SODIUM: CPT | Performed by: OBSTETRICS & GYNECOLOGY

## 2021-05-05 PROCEDURE — 85025 COMPLETE CBC W/AUTO DIFF WBC: CPT | Performed by: OBSTETRICS & GYNECOLOGY

## 2021-05-05 PROCEDURE — 99024 POSTOP FOLLOW-UP VISIT: CPT | Performed by: OBSTETRICS & GYNECOLOGY

## 2021-05-05 ASSESSMENT — MIFFLIN-ST. JEOR: SCORE: 1270.69

## 2021-05-05 NOTE — PROGRESS NOTES
The patient is seen for postoperative check today.  She is having no GYN problems but she did have a fall on arriving at home and was back in the [facility] emergency room.  She lost couple units of blood and had numerous sutures placed in her right sided crown of her scalp.  She is asking for suture removal today.  Her hemoglobin is 8.8 g%.  This explains why she is weak.  She is not having any GYN problems and her pelvic is deferred to 2 weeks from now.  She is to take Slow Fe.

## 2021-05-06 LAB — SODIUM SERPL-SCNC: 128 MMOL/L (ref 133–144)

## 2021-05-18 NOTE — PROGRESS NOTES
SUBJECTIVE:                                                   Mandy Barnes is a 70 year old female who presents to clinic today for the following health issue(s):  Patient presents with:  Post-op Visit: TOTAL LAPAROSCOPIC VAGINAL HYSTERECTOMY , BILATERAL SALPINGO-OOPHORECTOMY      Additional information: She would like the rest of her stiches out today.    HPI: Patient is seen at this time in follow-up for her postoperative condition after hysterectomy and repairs.  She is healing very well.  She did have a fall with large laceration on the crown of her head.  Last time we took out her stitches but she still feels a small fiber.  Follow-up hemoglobin is done today.      No LMP recorded. Patient is postmenopausal..     Patient is not sexually active, .  Using hysterectomy for contraception.    reports that she has never smoked. She has never used smokeless tobacco.    STD testing offered?  Declined    Health maintenance updated:  yes    Today's PHQ-2 Score:   PHQ-2 (  [employer]) 2020   Q1: Little interest or pleasure in doing things 0   Q2: Feeling down, depressed or hopeless 0   PHQ-2 Score 0     Today's PHQ-9 Score: No flowsheet data found.  Today's LOKI-7 Score: No flowsheet data found.    Problem list and histories reviewed & adjusted, as indicated.  Additional history: as documented.    Patient Active Problem List   Diagnosis     Uterovaginal prolapse     Past Surgical History:   Procedure Laterality Date     APPENDECTOMY       AS HYSTEROSCOPY W ENDOMETRIAL BX/POLYPECTOMY W/WO D&C       C TOTAL HIP ARTHROPLASTY       COLPORRHAPHY POSTERIOR N/A 2021    Procedure: POSTERIOR REPAIR. ENTEROCELE REPAIR;  Surgeon: Mil Melo MD;  Location:  OR     EYE SURGERY      cataract removal     LAPAROSCOPIC ASSISTED HYSTERECTOMY VAGINAL, BILATERAL SALPINGO-OOPHORECTOMY, COMBINED N/A 2021    Procedure: TOTAL LAPAROSCOPIC VAGINAL HYSTERECTOMY , BILATERAL SALPINGO-OOPHORECTOMY;   "Surgeon: Mil Melo MD;  Location:  OR      Social History     Tobacco Use     Smoking status: Never Smoker     Smokeless tobacco: Never Used   Substance Use Topics     Alcohol use: Yes     Alcohol/week: 0.0 standard drinks     Comment: occ      Problem (# of Occurrences) Relation (Name,Age of Onset)    Cancer (1) Brother    Coronary Artery Disease (1) Father    Hyperlipidemia (1) Father    Hypertension (3) Mother, Sister, Sister    No Known Problems (5) Maternal Grandmother, Maternal Grandfather, Paternal Grandmother, Paternal Grandfather, Other            Current Outpatient Medications   Medication Sig     aspirin 81 MG EC tablet Take 81 mg by mouth every morning      atorvastatin (LIPITOR) 80 MG tablet Take 80 mg by mouth every evening      carvedilol (COREG) 12.5 MG tablet Take 12.5 mg by mouth daily (with dinner) (in addition to morning dose)     carvedilol (COREG) 6.25 MG tablet Take 6.25 mg by mouth daily (with breakfast) (In addition to evening dose)     lisinopril (ZESTRIL) 20 MG tablet Take 20 mg by mouth every morning      loratadine (CLARITIN) 10 MG tablet Take 10 mg by mouth every morning     LUMIGAN 0.01 % SOLN ophthalmic solution Place 1 drop into the right eye every evening      metroNIDAZOLE (METROGEL) 1 % external gel Apply topically every evening ON FACE     No current facility-administered medications for this visit.      Allergies   Allergen Reactions     Penicillins Anaphylaxis     Pollen Extract      Latex Rash       ROS:  12 point review of systems negative other than symptoms noted below or in the HPI.  No urinary frequency or dysuria, bladder or kidney problems      OBJECTIVE:     /64   Ht 1.651 m (5' 5\")   Wt 72.9 kg (160 lb 12.8 oz)   Breastfeeding No   BMI 26.76 kg/m    Body mass index is 26.76 kg/m .    Exam:  Constitutional:  Appearance: Well nourished, well developed alert, in no acute distress  Neck:  Lymph Nodes:  No lymphadenopathy present; Thyroid:  Gland size " normal, nontender, no nodules or masses present on palpation  Gastrointestinal:  Abdominal Examination:  Abdomen nontender to palpation, tone normal without rigidity or guarding, no masses present, umbilicus without lesions; Liver/Spleen:  No hepatomegaly present, liver nontender to palpation; Hernias:  No hernias present  Lymphatic: Lymph Nodes:  No other lymphadenopathy present  Skin: General Inspection:  No rashes present, no lesions present, no areas of discoloration.  Neurologic:  Mental Status:  Oriented X3.  Normal strength and tone, sensory exam grossly normal, mentation intact and speech normal.    Psychiatric:  Mentation appears normal and affect normal/bright.  Pelvic Exam:  External Genitalia:     Normal appearance for age, no discharge present, no tenderness present, no inflammatory lesions present, color normal  Vagina:     Normal vaginal vault without central or paravaginal defects, no discharge present, no inflammatory lesions present, no masses present  Bladder:     Nontender to palpation  Urethra:   Urethral Body:  Urethra palpation normal, urethra structural support normal   Urethral Meatus:  No erythema or lesions present  Cervix:     Surgically absent  Uterus:     Surgically absent  Adnexa:     Surgically absent  Perineum:     Perineum within normal limits, no evidence of trauma, no rashes or skin lesions present  Anus:     Anus within normal limits, no hemorrhoids present  Inguinal Lymph Nodes:     No lymphadenopathy present     In-Clinic Test Results:      ASSESSMENT/PLAN:                                                        ICD-10-CM    1. Postop check  Z09 Hemoglobin   2. Other iron deficiency anemia  D50.8 Hemoglobin       We removed 1 small fiber of suture but the remainder of the scab is so large and thick that we can determine what might be under there.  I do not when I remove the scab at this time.  Bimanual examination and speculum exam shows excellent healing of a hysterectomy and  repairs.  We will see her back in 3 months.        Mil Melo MD  [facility]

## 2021-05-19 ENCOUNTER — OFFICE VISIT (OUTPATIENT)
Dept: OBGYN | Facility: CLINIC | Age: 71
End: 2021-05-19
Payer: MEDICARE

## 2021-05-19 VITALS
HEIGHT: 65 IN | BODY MASS INDEX: 26.79 KG/M2 | WEIGHT: 160.8 LBS | DIASTOLIC BLOOD PRESSURE: 64 MMHG | SYSTOLIC BLOOD PRESSURE: 134 MMHG

## 2021-05-19 DIAGNOSIS — Z09 POSTOP CHECK: Primary | ICD-10-CM

## 2021-05-19 DIAGNOSIS — D50.8 OTHER IRON DEFICIENCY ANEMIA: ICD-10-CM

## 2021-05-19 LAB — HGB BLD-MCNC: 11 G/DL (ref 11.7–15.7)

## 2021-05-19 PROCEDURE — 85018 HEMOGLOBIN: CPT | Performed by: OBSTETRICS & GYNECOLOGY

## 2021-05-19 PROCEDURE — 99024 POSTOP FOLLOW-UP VISIT: CPT | Performed by: OBSTETRICS & GYNECOLOGY

## 2021-05-19 PROCEDURE — 36415 COLL VENOUS BLD VENIPUNCTURE: CPT | Performed by: OBSTETRICS & GYNECOLOGY

## 2021-05-19 ASSESSMENT — MIFFLIN-ST. JEOR: SCORE: 1255.26

## 2021-08-18 NOTE — PROGRESS NOTES
SUBJECTIVE:                                                   Mandy Barnes is a 71 year old female who presents to clinic today for the following health issue(s):  Patient presents with:  Follow Up: TOTAL LAPAROSCOPIC VAGINAL HYSTERECTOMY , BILATERAL SALPINGO-OOPHORECTOMY      HPI: The patient is seen at this time for distant follow-up after hysterectomy.  She feels very good and has had no abnormal bleeding or discharge.  Her only complaint at this time is submammary yeast infections on both sides.      No LMP recorded. Patient is postmenopausal..     Patient is not sexually active, .  Using hysterectomy for contraception.    reports that she has never smoked. She has never used smokeless tobacco.    STD testing offered?  Declined    Health maintenance updated:  yes    Today's PHQ-2 Score:   PHQ-2 (  [employer]) 2021   Q1: Little interest or pleasure in doing things 0   Q2: Feeling down, depressed or hopeless 0   PHQ-2 Score 0     Today's PHQ-9 Score: No flowsheet data found.  Today's LOKI-7 Score: No flowsheet data found.    Problem list and histories reviewed & adjusted, as indicated.  Additional history: as documented.    Patient Active Problem List   Diagnosis     Uterovaginal prolapse     Past Surgical History:   Procedure Laterality Date     APPENDECTOMY       AS HYSTEROSCOPY W ENDOMETRIAL BX/POLYPECTOMY W/WO D&C       C TOTAL HIP ARTHROPLASTY       COLPORRHAPHY POSTERIOR N/A 2021    Procedure: POSTERIOR REPAIR. ENTEROCELE REPAIR;  Surgeon: Mil Melo MD;  Location:  OR     EYE SURGERY  2015    cataract removal     LAPAROSCOPIC ASSISTED HYSTERECTOMY VAGINAL, BILATERAL SALPINGO-OOPHORECTOMY, COMBINED N/A 2021    Procedure: TOTAL LAPAROSCOPIC VAGINAL HYSTERECTOMY , BILATERAL SALPINGO-OOPHORECTOMY;  Surgeon: Mil Melo MD;  Location:  OR      Social History     Tobacco Use     Smoking status: Never Smoker     Smokeless tobacco: Never Used   Substance Use Topics  "    Alcohol use: Yes     Alcohol/week: 0.0 standard drinks     Comment: occ      Problem (# of Occurrences) Relation (Name,Age of Onset)    Cancer (1) Brother    Coronary Artery Disease (1) Father    Hyperlipidemia (1) Father    Hypertension (3) Mother, Sister, Sister    No Known Problems (5) Maternal Grandmother, Maternal Grandfather, Paternal Grandmother, Paternal Grandfather, Other            Current Outpatient Medications   Medication Sig     aspirin 81 MG EC tablet Take 81 mg by mouth every morning      atorvastatin (LIPITOR) 80 MG tablet Take 80 mg by mouth every evening      carvedilol (COREG) 12.5 MG tablet Take 12.5 mg by mouth daily (with dinner) (in addition to morning dose)     lisinopril (ZESTRIL) 20 MG tablet Take 20 mg by mouth every morning      loratadine (CLARITIN) 10 MG tablet Take 10 mg by mouth every morning     LUMIGAN 0.01 % SOLN ophthalmic solution Place 1 drop into the right eye every evening      metroNIDAZOLE (METROGEL) 1 % external gel Apply topically every evening ON FACE     No current facility-administered medications for this visit.     Allergies   Allergen Reactions     Penicillins Anaphylaxis     Pollen Extract      Latex Rash       ROS:  12 point review of systems negative other than symptoms noted below or in the HPI.  No urinary frequency or dysuria, bladder or kidney problems      OBJECTIVE:     BP (!) 144/78   Ht 1.651 m (5' 5\")   Wt 73.8 kg (162 lb 12.8 oz)   Breastfeeding No   BMI 27.09 kg/m    Body mass index is 27.09 kg/m .    Exam:  Constitutional:  Appearance: Well nourished, well developed alert, in no acute distress  Breasts:  Inspection of Breasts:  Symmetric bilaterally.  No puckering.  Bilateral submammary erythema-yeast infection palpation of Breasts and Axillae:  No masses present on palpation, no breast tenderness Axillary Lymph Nodes:  No lymphadenopathy present  Pelvic Exam:  External Genitalia:     Normal appearance for age, no discharge present, no " tenderness present, no inflammatory lesions present, color normal  Vagina:     Normal vaginal vault without central or paravaginal defects, no discharge present, no inflammatory lesions present, no masses present  Bladder:     Nontender to palpation  Urethra:   Urethral Body:  Urethra palpation normal, urethra structural support normal   Urethral Meatus:  No erythema or lesions present  Cervix:     Surgically absent  Uterus:     Surgically absent  Adnexa:     Surgically absent  Perineum:     Perineum within normal limits, no evidence of trauma, no rashes or skin lesions present  Anus:     Anus within normal limits, no hemorrhoids present  Inguinal Lymph Nodes:     No lymphadenopathy present     In-Clinic Test Results:  No results found for this or any previous visit (from the past 24 hour(s)).  ASSESSMENT/PLAN:                                                        ICD-10-CM    1. Yeast infection of the skin  B37.2      The patient has excellent long-term support and healing from her hysterectomy.  She has developed a chronic submammary cutaneous yeast infection.  She needs to keep skin off of skin even if it means wearing a sports bra at night.  We will treat this with an antifungal cream at this time.        Mil Melo MD  [facility] [place]

## 2021-08-25 ENCOUNTER — OFFICE VISIT (OUTPATIENT)
Dept: OBGYN | Facility: CLINIC | Age: 71
End: 2021-08-25
Payer: MEDICARE

## 2021-08-25 VITALS
BODY MASS INDEX: 27.12 KG/M2 | WEIGHT: 162.8 LBS | SYSTOLIC BLOOD PRESSURE: 144 MMHG | DIASTOLIC BLOOD PRESSURE: 78 MMHG | HEIGHT: 65 IN

## 2021-08-25 DIAGNOSIS — B37.2 YEAST INFECTION OF THE SKIN: Primary | ICD-10-CM

## 2021-08-25 PROCEDURE — 99213 OFFICE O/P EST LOW 20 MIN: CPT | Performed by: OBSTETRICS & GYNECOLOGY

## 2021-08-25 RX ORDER — NYSTATIN 100000 U/G
CREAM TOPICAL 2 TIMES DAILY
Qty: 30 G | Refills: 3 | Status: SHIPPED | OUTPATIENT
Start: 2021-08-25

## 2021-08-25 ASSESSMENT — MIFFLIN-ST. JEOR: SCORE: 1259.34

## 2021-09-05 ENCOUNTER — HEALTH MAINTENANCE LETTER (OUTPATIENT)
Age: 71
End: 2021-09-05

## 2021-10-04 ENCOUNTER — TELEPHONE (OUTPATIENT)
Dept: OBGYN | Facility: CLINIC | Age: 71
End: 2021-10-04

## 2021-10-04 NOTE — TELEPHONE ENCOUNTER
8/25/21 nystatin cream for yeast under breast at last OV w Dr Melo  Pt has been using cream for last 3 months with no improvement, in fact worsening sx's.    Recommended pt schedule an apt with dermatologist - pt has a dermatologist but states it is hard to get in to see her and wants Dr Melo to advise/look at it.  Dr Melo is booked out to December.    Pt wants Dr Reyes input when her returns to clinic tomorrow - ok to fit patient in tomorrow or different recommended Rx? Or dermatology?    Pt verbalized understanding, in agreement with plan, and voiced no further questions.  Sherry Paul RN on 10/4/2021 at 11:23 AM

## 2022-02-19 ENCOUNTER — HEALTH MAINTENANCE LETTER (OUTPATIENT)
Age: 72
End: 2022-02-19

## 2022-10-22 ENCOUNTER — HEALTH MAINTENANCE LETTER (OUTPATIENT)
Age: 72
End: 2022-10-22

## 2023-04-01 ENCOUNTER — HEALTH MAINTENANCE LETTER (OUTPATIENT)
Age: 73
End: 2023-04-01

## 2023-11-05 ENCOUNTER — HEALTH MAINTENANCE LETTER (OUTPATIENT)
Age: 73
End: 2023-11-05

## (undated) DEVICE — SU VICRYL 0 CT-2 27" J334H

## (undated) DEVICE — ESU ELEC BLADE 6" COATED E1450-6

## (undated) DEVICE — GOWN IMPERVIOUS BREATHABLE SMART LG 89015

## (undated) DEVICE — NDL SPINAL 20GA 3.5" 405182

## (undated) DEVICE — SUCTION TIP YANKAUER W/O VENT K86

## (undated) DEVICE — SOL RINGERS LACTATED 1000ML BAG 2B2324X

## (undated) DEVICE — NDL COUNTER 20CT 31142493

## (undated) DEVICE — Device

## (undated) DEVICE — TUBING HYDRO-SURG PLUS LAP IRRIGATOR SUCTION 0026870

## (undated) DEVICE — SU VICRYL 0 CT-1 27" J340H

## (undated) DEVICE — GLOVE PROTEXIS MICRO 7.5  2D73PM75

## (undated) DEVICE — SU VICRYL 4-0 PS-2 18" UND J496H

## (undated) DEVICE — TUBING SUCTION 12"X1/4" N612

## (undated) DEVICE — SYR 10ML FINGER CONTROL W/O NDL 309695

## (undated) DEVICE — PACK SET-UP STD 9102

## (undated) DEVICE — SYR 10ML SLIP TIP W/O NDL

## (undated) DEVICE — PREP SKIN SCRUB TRAY 4461A

## (undated) DEVICE — BLADE CLIPPER 4406

## (undated) DEVICE — EVAC SYSTEM CLEAR FLOW SC082500

## (undated) DEVICE — ESU FCP OLYMPUS PK CUTTING 5MMX33CM PK-CF0533

## (undated) DEVICE — GLOVE PROTEXIS W/NEU-THERA 8.0  2D73TE80

## (undated) DEVICE — SYR 05ML SLIP TIP W/O NDL 309647

## (undated) DEVICE — LINEN TOWEL PACK X5 5464

## (undated) DEVICE — ESU PENCIL W/HOLSTER E2350H

## (undated) DEVICE — CATH TRAY FOLEY SURESTEP 16FR W/URINE MTR STATLK LF A303416A

## (undated) DEVICE — ESU HOLDER LAP INST DISP PURPLE LONG 330MM H-PRO-330

## (undated) DEVICE — ENDO SCOPE WARMER LF TM500

## (undated) DEVICE — NDL INSUFFLATION 13GA 120MM C2201

## (undated) DEVICE — SOL WATER IRRIG 1000ML BOTTLE 2F7114

## (undated) DEVICE — NDL 19GA 1.5"

## (undated) DEVICE — SU VICRYL 2-0 CT-2 27" J333H

## (undated) DEVICE — NDL 22GA 1.5"

## (undated) DEVICE — BLADE KNIFE SURG 15 371115

## (undated) DEVICE — SU CHROMIC 2-0 CT-2 27" 883H

## (undated) RX ORDER — GLYCOPYRROLATE 0.2 MG/ML
INJECTION, SOLUTION INTRAMUSCULAR; INTRAVENOUS
Status: DISPENSED
Start: 2021-04-20

## (undated) RX ORDER — LIDOCAINE HYDROCHLORIDE 20 MG/ML
INJECTION, SOLUTION EPIDURAL; INFILTRATION; INTRACAUDAL; PERINEURAL
Status: DISPENSED
Start: 2021-04-20

## (undated) RX ORDER — DEXAMETHASONE SODIUM PHOSPHATE 4 MG/ML
INJECTION, SOLUTION INTRA-ARTICULAR; INTRALESIONAL; INTRAMUSCULAR; INTRAVENOUS; SOFT TISSUE
Status: DISPENSED
Start: 2021-04-20

## (undated) RX ORDER — ONDANSETRON 2 MG/ML
INJECTION INTRAMUSCULAR; INTRAVENOUS
Status: DISPENSED
Start: 2021-04-20

## (undated) RX ORDER — NALOXONE HYDROCHLORIDE 0.4 MG/ML
INJECTION, SOLUTION INTRAMUSCULAR; INTRAVENOUS; SUBCUTANEOUS
Status: DISPENSED
Start: 2021-04-20

## (undated) RX ORDER — CLINDAMYCIN PHOSPHATE 900 MG/50ML
INJECTION, SOLUTION INTRAVENOUS
Status: DISPENSED
Start: 2021-04-20

## (undated) RX ORDER — HYDROMORPHONE HYDROCHLORIDE 1 MG/ML
INJECTION, SOLUTION INTRAMUSCULAR; INTRAVENOUS; SUBCUTANEOUS
Status: DISPENSED
Start: 2021-04-20

## (undated) RX ORDER — FENTANYL CITRATE 0.05 MG/ML
INJECTION, SOLUTION INTRAMUSCULAR; INTRAVENOUS
Status: DISPENSED
Start: 2021-04-20

## (undated) RX ORDER — FENTANYL CITRATE 50 UG/ML
INJECTION, SOLUTION INTRAMUSCULAR; INTRAVENOUS
Status: DISPENSED
Start: 2021-04-20

## (undated) RX ORDER — NEOSTIGMINE METHYLSULFATE 1 MG/ML
VIAL (ML) INJECTION
Status: DISPENSED
Start: 2021-04-20

## (undated) RX ORDER — ACETAMINOPHEN 325 MG/1
TABLET ORAL
Status: DISPENSED
Start: 2021-04-20

## (undated) RX ORDER — PROPOFOL 10 MG/ML
INJECTION, EMULSION INTRAVENOUS
Status: DISPENSED
Start: 2021-04-20